# Patient Record
Sex: FEMALE | Race: WHITE | NOT HISPANIC OR LATINO | URBAN - METROPOLITAN AREA
[De-identification: names, ages, dates, MRNs, and addresses within clinical notes are randomized per-mention and may not be internally consistent; named-entity substitution may affect disease eponyms.]

---

## 2019-09-03 ENCOUNTER — APPOINTMENT (OUTPATIENT)
Dept: LAB | Facility: HOSPITAL | Age: 74
End: 2019-09-03
Payer: MEDICARE

## 2019-09-03 ENCOUNTER — TRANSCRIBE ORDERS (OUTPATIENT)
Dept: LAB | Facility: HOSPITAL | Age: 74
End: 2019-09-03

## 2019-09-03 ENCOUNTER — APPOINTMENT (OUTPATIENT)
Dept: PREADMISSION TESTING | Facility: HOSPITAL | Age: 74
End: 2019-09-03
Payer: MEDICARE

## 2019-09-03 VITALS
BODY MASS INDEX: 35.31 KG/M2 | HEART RATE: 90 BPM | SYSTOLIC BLOOD PRESSURE: 116 MMHG | TEMPERATURE: 97.1 F | HEIGHT: 68 IN | WEIGHT: 233 LBS | OXYGEN SATURATION: 96 % | RESPIRATION RATE: 20 BRPM | DIASTOLIC BLOOD PRESSURE: 66 MMHG

## 2019-09-03 DIAGNOSIS — Z01.818 ENCOUNTER FOR OTHER PREPROCEDURAL EXAMINATION: ICD-10-CM

## 2019-09-03 DIAGNOSIS — G47.33 OSA (OBSTRUCTIVE SLEEP APNEA): Primary | ICD-10-CM

## 2019-09-03 DIAGNOSIS — E78.5 HYPERLIPIDEMIA, UNSPECIFIED HYPERLIPIDEMIA TYPE: ICD-10-CM

## 2019-09-03 DIAGNOSIS — M17.11 PRIMARY OSTEOARTHRITIS OF RIGHT KNEE: Primary | ICD-10-CM

## 2019-09-03 DIAGNOSIS — M17.11 PRIMARY OSTEOARTHRITIS OF RIGHT KNEE: ICD-10-CM

## 2019-09-03 LAB
ANION GAP SERPL CALC-SCNC: 6 MEQ/L (ref 3–15)
BUN SERPL-MCNC: 13 MG/DL (ref 8–20)
CALCIUM SERPL-MCNC: 8.8 MG/DL (ref 8.9–10.3)
CHLORIDE SERPL-SCNC: 105 MEQ/L (ref 98–109)
CO2 SERPL-SCNC: 29 MEQ/L (ref 22–32)
CREAT SERPL-MCNC: 0.7 MG/DL
ERYTHROCYTE [DISTWIDTH] IN BLOOD BY AUTOMATED COUNT: 12.9 % (ref 11.7–14.4)
GFR SERPL CREATININE-BSD FRML MDRD: >60 ML/MIN/1.73M*2
GLUCOSE SERPL-MCNC: 104 MG/DL (ref 70–99)
HCT VFR BLDCO AUTO: 37.6 %
HGB BLD-MCNC: 12.4 G/DL
MCH RBC QN AUTO: 28.1 PG (ref 28–33.2)
MCHC RBC AUTO-ENTMCNC: 33 G/DL (ref 32.2–35.5)
MCV RBC AUTO: 85.1 FL (ref 83–98)
PDW BLD AUTO: 10.1 FL (ref 9.4–12.3)
PLATELET # BLD AUTO: 241 K/UL
POTASSIUM SERPL-SCNC: 3.7 MEQ/L (ref 3.6–5.1)
RBC # BLD AUTO: 4.42 M/UL (ref 3.93–5.22)
SODIUM SERPL-SCNC: 140 MEQ/L (ref 136–144)
WBC # BLD AUTO: 8.62 K/UL

## 2019-09-03 PROCEDURE — 36415 COLL VENOUS BLD VENIPUNCTURE: CPT

## 2019-09-03 PROCEDURE — 85027 COMPLETE CBC AUTOMATED: CPT

## 2019-09-03 PROCEDURE — 80048 BASIC METABOLIC PNL TOTAL CA: CPT

## 2019-09-03 PROCEDURE — 99203 OFFICE O/P NEW LOW 30 MIN: CPT | Performed by: HOSPITALIST

## 2019-09-03 RX ORDER — LEVOTHYROXINE SODIUM 200 UG/1
200 TABLET ORAL 3 TIMES WEEKLY
COMMUNITY

## 2019-09-03 RX ORDER — LORATADINE 10 MG/1
10 TABLET ORAL EVERY EVENING
COMMUNITY

## 2019-09-03 RX ORDER — ROSUVASTATIN CALCIUM 10 MG/1
10 TABLET, COATED ORAL NIGHTLY
COMMUNITY

## 2019-09-03 RX ORDER — LEVOTHYROXINE SODIUM 175 UG/1
175 TABLET ORAL
COMMUNITY

## 2019-09-03 RX ORDER — ASPIRIN 81 MG/1
81 TABLET ORAL DAILY
Status: ON HOLD | COMMUNITY
End: 2019-09-23

## 2019-09-03 RX ORDER — ACETAMINOPHEN 500 MG
2000 TABLET ORAL DAILY
COMMUNITY

## 2019-09-03 RX ORDER — DOXYCYCLINE HYCLATE 50 MG/1
50 CAPSULE, GELATIN COATED ORAL EVERY EVENING
COMMUNITY

## 2019-09-03 ASSESSMENT — PAIN SCALES - GENERAL: PAINLEVEL: 10-WORST PAIN EVER

## 2019-09-03 NOTE — PRE-PROCEDURE INSTRUCTIONS
1. Admissions will call you with your arrival time on September 20th (Friday  prior to surgery) between 2pm - 4pm. For questions about your arrival time, please call [phone].    2. Please report to the [facility] in the [place] on the day of your procedure. From the [place] Lobby stay to the right side of the lobby, down the short hallway into the atrium. If you are parking in the [facility] [place] [facility], come to the ground floor of the garage and follow signs to the [facility] which will bring you up a ramp into the atrium. Bring your insurance card and photo ID.     3. Please follow these fasting guidelines:   -Stop food and liquids 8 hours before your arrival time.   -You may continue to drink up to 12 oz of water,but you must stop 2 hours prior to your arrival time.   -There is nothing to drink 2 hours before your arrival time (including gum, mints, candy, and water).    4. Early on the morning of the procedure, please take the following medications listed below with a sip of water, in addition to any medications prescribed by your surgeon:  TAKE ONLY LEVOTHYROXINE AM OF SURGERY WITH SIP OF WATER       *NO aspirin, ibuprofen, anti-inflammatories, fish oil or Vitamin E unless ordered by physician.    *Stop taking ABOVE PER DR ARMIJO      5. Other instructions: antibacterial shower x 2, NIGHT BEFORE SURGERY AND AM OF SURGERY,   NO BODY PRODUCTS   brush teeth    6. If you develop a cough, cold, fever, or other symptom prior to the date of the procedure, please report it to your physician immediately.    7. If you need to cancel the procedure for any reason, please contact your physician.    8. Make arrangements to have someone drive you home from the procedure. If you have not arranged for transportation home, your surgery may be cancelled.     9. You may not take public transportation or or a cab unless accompanied by a responsible person.    10. You may not drive a car or operate complex or  potentially dangerous machinery for 24 hours following anesthesia and/or sedation.    11. If it is medically necessary for you to have a longer stay, you will be informed as soon as the decision is made.    12. Do not wear or bring anything of value to the hospital, including jewelry of any kind. Do not wear make-up or contact lenses. DO bring your glasses and hearing aid, with a case.    13. Dress in comfortable clothes.    14. If instructed, please bring a copy of your Advance Directive (Living Will/Durable Power of ) on the day of your procedure.    Pre operative instructions given as per protocol.  Form explained by:     LOCO CELESTE RN  [phone]

## 2019-09-03 NOTE — CONSULTS
[facility] Medicine Service -  Pre-Operative Consultation       Patient Name: Milly Shepherd  Referring Surgeon: Dr. Otilio Fernandez    Reason for Referral: Pre-Operative Evaluation  Surgical Procedure: Right knee replacement  Operative Date: 9/23/2019  Other Providers:      PCP: Corey Michelle DO          HISTORY OF PRESENT ILLNESS      Milly Shepherd is a 74 y.o. female presenting today to the [facility] at [facility] for pre-operative evaluation prior to planned surgery.    States has had pain in her right knee for approxinately 7 years.  Tried management with exercise, pain control and knee injections but pain has progressively worsened in recent months and now starting to interfere with her mobility, activity levels.  Orthopedic evaluation revealed her to have bone-on-bone arthritis in her right knee so surgery recommended.    In regards to medical history:      The patient denies any current or recent chest pain or pressure, dyspnea, cough, sputum, fevers, chills, abdominal pain, nausea, vomiting, diarrhea or other symptoms. No urinary symptoms, no BRBPR or melena    Functionally, the patient is able to ascend a flight or so of stairs with no dyspnea or chest pain.     States she is compliant with her CPAP use.    Does have a hx of SVT which causes her occasional palpitations but none in several months.    The patient denies, on specific questioning, the following:  No history of MI or CHF.  No history of DVT/PE.  No history of COPD.  No history of CVA.  No history of DM.   No history of CKD.     PAST MEDICAL AND SURGICAL HISTORY      Past Medical History:   Diagnosis Date   • Anemia     in past   • Arthritis    • Depression     never on meds   • Heart palpitations     none recently   followed by cardiologist   • Hypothyroidism    • Lipid disorder    • Low back problem     occ back pain   arthritis    • Rosacea    • Seasonal allergies    • Sleep apnea   "   has cpap       Past Surgical History:   Procedure Laterality Date   • COLONOSCOPY     • DILATION AND CURETTAGE OF UTERUS     • HYSTERECTOMY         MEDICATIONS        Current Outpatient Prescriptions:   •  aspirin 81 mg enteric coated tablet, Take 81 mg by mouth daily. Plans to stop preop, Disp: , Rfl:   •  cholecalciferol, vitamin D3, (VITAMIN D3) 2,000 unit capsule, Take 2,000 Units by mouth daily., Disp: , Rfl:   •  doxycycline hyclate (VIBRAMYCIN) 50 mg capsule, Take 50 mg by mouth every evening., Disp: , Rfl:   •  levothyroxine (SYNTHROID) 175 mcg tablet, Take 175 mcg by mouth 4 (four) times a week. MTWT, Disp: , Rfl:   •  levothyroxine (SYNTHROID) 200 mcg tablet, Take 200 mcg by mouth 3 (three) times a week (Mon, Wed, Fri)., Disp: , Rfl:   •  loratadine (CLARITIN) 10 mg tablet, Take 10 mg by mouth every evening., Disp: , Rfl:   •  rosuvastatin (CRESTOR) 10 mg tablet, Take 10 mg by mouth nightly., Disp: , Rfl:     ALLERGIES      Patient has no known allergies.    FAMILY HISTORY      No pertinent family history    Denies any prior known family history of DVTs/PEs/clotting disorder    SOCIAL HISTORY      Social History   Substance Use Topics   • Smoking status: Former Smoker     Packs/day: 1.00     Years: 5.00     Types: Cigarettes   • Smokeless tobacco: Never Used      Comment: quit 40 yrs ago   • Alcohol use Yes      Comment: 1x/ month       REVIEW OF SYSTEMS      All other systems reviewed and negative except as noted in HPI    PHYSICAL EXAMINATION      /66 (BP Location: Right upper arm, Patient Position: Sitting)   Pulse 90   Temp 36.2 °C (97.1 °F) (Temporal)   Resp 20   Ht 1.727 m (5' 8\")   Wt 106 kg (233 lb)   SpO2 96%   Breastfeeding? No   BMI 35.43 kg/m²   Body mass index is 35.43 kg/m².    Physical Exam   Constitutional: She is oriented to person, place, and time. No distress.   HENT:   Head: Normocephalic and atraumatic.   Eyes: Pupils are equal, round, and reactive to light. " Conjunctivae are normal.   Neck: Neck supple.   Cardiovascular: Normal rate, regular rhythm and normal heart sounds.    No murmur heard.  Pulmonary/Chest: Effort normal and breath sounds normal. No respiratory distress. She has no wheezes. She has no rales.   Abdominal: Soft. Bowel sounds are normal. She exhibits no distension. There is no tenderness. There is no guarding.   Musculoskeletal:   Right knee limited ROM   Neurological: She is alert and oriented to person, place, and time. No cranial nerve deficit.   Skin: Skin is warm and dry. No rash noted.   Psychiatric: She has a normal mood and affect.       LABS / EKG        Labs  reviewed    Lab Results   Component Value Date     09/03/2019    K 3.7 09/03/2019     09/03/2019    BUN 13 09/03/2019    CREATININE 0.7 09/03/2019    WBC 8.62 09/03/2019    HGB 12.4 09/03/2019    HCT 37.6 09/03/2019     09/03/2019         ECG/Telemetry  EKG 8/29/2019 - sinus rhythm, left anterior sean block    ASSESSMENT AND PLAN         Encounter for other preprocedural examination  Good exercise capacity able to reach >4 METs activity level.  No hx of CAD, no CHF, no TIA/CVA. Does have a hx of SVT  Has been seen by her cardiologist Dr. Marquez for preop cardiovascular evaluation and cleared for surgery  EKG without any signs of active ischemia  Her CBC and BMP within acceptable limits    P:  Can proceed to surgery without additional testing.  NSAID use preop for orthopedic recommendations.    H/O supraventricular tachycardia  With palpitations though pt denies any symptoms in recent months  Monitor post op    DIONY (obstructive sleep apnea)  Continue CPAP nightly and as needed.  Monitor respiratory status closely helena-and postop on DIONY protocol.  Cautious use of narcotics/sedatives.    Hypothyroidism  Continue levothyroxine.    Hyperlipidemia  Continue statin.       In regards to perioperative cardiac risk:  The patient denies any history of ischemic heart disease,  denies any history of CHF, denies any history of CVA, is not on pre-operative treatment with insulin, and does not have a pre-operative creatinine > 2 mg/dL.   The Revised Cardiac Risk Index (RCRI) for this patient indicates 0.4% risk.     Further comments:  Resume supplements when OK with surgical team.  I would encourage incentive spirometry to assist with minimizing helena-operative pulmonary risk.  DVT prophylaxis and timing of such per the discretion of the surgeon.     Please do not hesitate to contact [facility] during the upcoming hospitalization with any questions or concerns.     Tim Alexis MD  9/4/2019

## 2019-09-03 NOTE — ASSESSMENT & PLAN NOTE
Good exercise capacity able to reach >4 METs activity level.  No hx of CAD, no CHF, no TIA/CVA. Does have a hx of SVT  Has been seen by her cardiologist Dr. Marquez for preop cardiovascular evaluation and cleared for surgery  EKG without any signs of active ischemia  Her CBC and BMP within acceptable limits    P:  Can proceed to surgery without additional testing.  NSAID use preop for orthopedic recommendations.

## 2019-09-04 PROBLEM — Z86.79 H/O SUPRAVENTRICULAR TACHYCARDIA: Status: ACTIVE | Noted: 2019-09-04

## 2019-09-04 PROBLEM — E78.5 HYPERLIPIDEMIA: Status: ACTIVE | Noted: 2019-09-04

## 2019-09-04 PROBLEM — G47.33 OSA (OBSTRUCTIVE SLEEP APNEA): Status: ACTIVE | Noted: 2019-09-04

## 2019-09-04 PROBLEM — E03.9 HYPOTHYROIDISM: Status: ACTIVE | Noted: 2019-09-04

## 2019-09-04 NOTE — ASSESSMENT & PLAN NOTE
Continue CPAP nightly and as needed.  Monitor respiratory status closely helena-and postop on DIONY protocol.  Cautious use of narcotics/sedatives.

## 2019-09-23 ENCOUNTER — ANESTHESIA EVENT (OUTPATIENT)
Dept: OPERATING ROOM | Facility: HOSPITAL | Age: 74
Setting detail: SURGERY ADMIT
DRG: 470 | End: 2019-09-23
Payer: MEDICARE

## 2019-09-23 ENCOUNTER — HOSPITAL ENCOUNTER (INPATIENT)
Facility: HOSPITAL | Age: 74
LOS: 3 days | DRG: 470 | End: 2019-09-26
Payer: MEDICARE

## 2019-09-23 ENCOUNTER — APPOINTMENT (OUTPATIENT)
Dept: RADIOLOGY | Facility: HOSPITAL | Age: 74
Setting detail: SURGERY ADMIT
DRG: 470 | End: 2019-09-23
Attending: NURSE PRACTITIONER
Payer: MEDICARE

## 2019-09-23 DIAGNOSIS — Z96.651 S/P TOTAL KNEE ARTHROPLASTY, RIGHT: Primary | ICD-10-CM

## 2019-09-23 LAB
GLUCOSE BLD-MCNC: 104 MG/DL (ref 70–99)
GLUCOSE BLD-MCNC: 115 MG/DL (ref 70–99)
POCT TEST: ABNORMAL
POCT TEST: ABNORMAL

## 2019-09-23 PROCEDURE — 63600000 HC DRUGS/DETAIL CODE

## 2019-09-23 PROCEDURE — 25800000 HC PHARMACY IV SOLUTIONS

## 2019-09-23 PROCEDURE — 25000000 HC PHARMACY GENERAL: Performed by: NURSE ANESTHETIST, CERTIFIED REGISTERED

## 2019-09-23 PROCEDURE — 71000011 HC PACU PHASE 1 EA ADDL MIN

## 2019-09-23 PROCEDURE — 36000016 HC OR LEVEL 6 EA ADDL MIN

## 2019-09-23 PROCEDURE — 63600000 HC DRUGS/DETAIL CODE: Performed by: ANESTHESIOLOGY

## 2019-09-23 PROCEDURE — 37000010 HC ANESTHESIA SPINAL

## 2019-09-23 PROCEDURE — 63700000 HC SELF-ADMINISTRABLE DRUG: Performed by: NURSE PRACTITIONER

## 2019-09-23 PROCEDURE — 99232 SBSQ HOSP IP/OBS MODERATE 35: CPT | Performed by: HOSPITALIST

## 2019-09-23 PROCEDURE — 25000000 HC PHARMACY GENERAL: Performed by: ANESTHESIOLOGY

## 2019-09-23 PROCEDURE — 63700000 HC SELF-ADMINISTRABLE DRUG

## 2019-09-23 PROCEDURE — 97116 GAIT TRAINING THERAPY: CPT | Mod: GP

## 2019-09-23 PROCEDURE — 63600000 HC DRUGS/DETAIL CODE: Performed by: NURSE PRACTITIONER

## 2019-09-23 PROCEDURE — 25000000 HC PHARMACY GENERAL

## 2019-09-23 PROCEDURE — 36000006 HC OR LEVEL 6 INITIAL 30MIN

## 2019-09-23 PROCEDURE — 25800000 HC PHARMACY IV SOLUTIONS: Performed by: NURSE ANESTHETIST, CERTIFIED REGISTERED

## 2019-09-23 PROCEDURE — 27200000 HC STERILE SUPPLY

## 2019-09-23 PROCEDURE — 73560 X-RAY EXAM OF KNEE 1 OR 2: CPT | Mod: RT

## 2019-09-23 PROCEDURE — 71000001 HC PACU PHASE 1 INITIAL 30MIN

## 2019-09-23 PROCEDURE — 97162 PT EVAL MOD COMPLEX 30 MIN: CPT | Mod: GP

## 2019-09-23 PROCEDURE — C1776 JOINT DEVICE (IMPLANTABLE): HCPCS

## 2019-09-23 PROCEDURE — 0SRC0JA REPLACEMENT OF RIGHT KNEE JOINT WITH SYNTHETIC SUBSTITUTE, UNCEMENTED, OPEN APPROACH: ICD-10-PCS

## 2019-09-23 PROCEDURE — 63600000 HC DRUGS/DETAIL CODE: Performed by: NURSE ANESTHETIST, CERTIFIED REGISTERED

## 2019-09-23 PROCEDURE — 97166 OT EVAL MOD COMPLEX 45 MIN: CPT | Mod: GO

## 2019-09-23 PROCEDURE — 12000000 HC ROOM AND CARE MED/SURG

## 2019-09-23 PROCEDURE — 25800000 HC PHARMACY IV SOLUTIONS: Performed by: NURSE PRACTITIONER

## 2019-09-23 DEVICE — PATELLA
Type: IMPLANTABLE DEVICE | Site: PATELLA | Status: FUNCTIONAL
Brand: TRIATHLON

## 2019-09-23 DEVICE — CRUCIATE RETAINING FEMORAL
Type: IMPLANTABLE DEVICE | Site: KNEE | Status: FUNCTIONAL
Brand: TRIATHLON

## 2019-09-23 DEVICE — TIBIAL BEARING INSERT
Type: IMPLANTABLE DEVICE | Site: KNEE | Status: FUNCTIONAL
Brand: TRIATHLON

## 2019-09-23 DEVICE — TIBIAL COMPONENT
Type: IMPLANTABLE DEVICE | Site: KNEE | Status: FUNCTIONAL
Brand: TRIATHLON

## 2019-09-23 RX ORDER — LIDOCAINE HYDROCHLORIDE 10 MG/ML
INJECTION, SOLUTION INFILTRATION; PERINEURAL AS NEEDED
Status: DISCONTINUED | OUTPATIENT
Start: 2019-09-23 | End: 2019-09-23 | Stop reason: SURG

## 2019-09-23 RX ORDER — LEVOTHYROXINE SODIUM 100 UG/1
200 TABLET ORAL
Status: DISCONTINUED | OUTPATIENT
Start: 2019-09-27 | End: 2019-09-26 | Stop reason: HOSPADM

## 2019-09-23 RX ORDER — DEXTROSE 50 % IN WATER (D50W) INTRAVENOUS SYRINGE
25 AS NEEDED
Status: DISCONTINUED | OUTPATIENT
Start: 2019-09-23 | End: 2019-09-23 | Stop reason: HOSPADM

## 2019-09-23 RX ORDER — LEVOTHYROXINE SODIUM 175 UG/1
175 TABLET ORAL
Status: DISCONTINUED | OUTPATIENT
Start: 2019-09-24 | End: 2019-09-26 | Stop reason: HOSPADM

## 2019-09-23 RX ORDER — ACETAMINOPHEN 325 MG/1
650 TABLET ORAL
Status: COMPLETED | OUTPATIENT
Start: 2019-09-23 | End: 2019-09-25

## 2019-09-23 RX ORDER — KETOROLAC TROMETHAMINE 15 MG/ML
15 INJECTION, SOLUTION INTRAMUSCULAR; INTRAVENOUS
Status: COMPLETED | OUTPATIENT
Start: 2019-09-23 | End: 2019-09-25

## 2019-09-23 RX ORDER — ACETAMINOPHEN 325 MG/1
975 TABLET ORAL
Status: COMPLETED | OUTPATIENT
Start: 2019-09-23 | End: 2019-09-23

## 2019-09-23 RX ORDER — ACETAMINOPHEN 325 MG/1
TABLET ORAL
Status: COMPLETED
Start: 2019-09-23 | End: 2019-09-24

## 2019-09-23 RX ORDER — LABETALOL HCL 20 MG/4 ML
SYRINGE (ML) INTRAVENOUS AS NEEDED
Status: DISCONTINUED | OUTPATIENT
Start: 2019-09-23 | End: 2019-09-23 | Stop reason: SURG

## 2019-09-23 RX ORDER — DEXAMETHASONE SODIUM PHOSPHATE 4 MG/ML
8 INJECTION, SOLUTION INTRA-ARTICULAR; INTRALESIONAL; INTRAMUSCULAR; INTRAVENOUS; SOFT TISSUE ONCE
Status: COMPLETED | OUTPATIENT
Start: 2019-09-24 | End: 2019-09-24

## 2019-09-23 RX ORDER — FENTANYL CITRATE 50 UG/ML
50 INJECTION, SOLUTION INTRAMUSCULAR; INTRAVENOUS
Status: DISCONTINUED | OUTPATIENT
Start: 2019-09-23 | End: 2019-09-23 | Stop reason: HOSPADM

## 2019-09-23 RX ORDER — HYDROMORPHONE HYDROCHLORIDE 1 MG/ML
0.5 INJECTION, SOLUTION INTRAMUSCULAR; INTRAVENOUS; SUBCUTANEOUS
Status: DISCONTINUED | OUTPATIENT
Start: 2019-09-23 | End: 2019-09-23 | Stop reason: HOSPADM

## 2019-09-23 RX ORDER — LABETALOL HCL 20 MG/4 ML
5 SYRINGE (ML) INTRAVENOUS AS NEEDED
Status: DISCONTINUED | OUTPATIENT
Start: 2019-09-23 | End: 2019-09-23 | Stop reason: HOSPADM

## 2019-09-23 RX ORDER — CETIRIZINE HYDROCHLORIDE 5 MG/1
5 TABLET ORAL NIGHTLY
Status: DISCONTINUED | OUTPATIENT
Start: 2019-09-23 | End: 2019-09-26 | Stop reason: HOSPADM

## 2019-09-23 RX ORDER — DOXYCYCLINE HYCLATE 50 MG/1
50 CAPSULE, GELATIN COATED ORAL EVERY EVENING
Status: DISCONTINUED | OUTPATIENT
Start: 2019-09-23 | End: 2019-09-26 | Stop reason: HOSPADM

## 2019-09-23 RX ORDER — CELECOXIB 200 MG/1
CAPSULE ORAL
Status: DISCONTINUED
Start: 2019-09-23 | End: 2019-09-26 | Stop reason: HOSPADM

## 2019-09-23 RX ORDER — PROPOFOL 10 MG/ML
INJECTION, EMULSION INTRAVENOUS AS NEEDED
Status: DISCONTINUED | OUTPATIENT
Start: 2019-09-23 | End: 2019-09-23 | Stop reason: SURG

## 2019-09-23 RX ORDER — BISACODYL 10 MG/1
10 SUPPOSITORY RECTAL DAILY PRN
Status: DISCONTINUED | OUTPATIENT
Start: 2019-09-23 | End: 2019-09-26 | Stop reason: HOSPADM

## 2019-09-23 RX ORDER — IBUPROFEN 200 MG
16-32 TABLET ORAL AS NEEDED
Status: DISCONTINUED | OUTPATIENT
Start: 2019-09-23 | End: 2019-09-26 | Stop reason: HOSPADM

## 2019-09-23 RX ORDER — ASPIRIN 81 MG/1
81 TABLET ORAL DAILY
Qty: 30 TABLET | Refills: 0
Start: 2019-09-23 | End: 2019-10-23

## 2019-09-23 RX ORDER — FENTANYL CITRATE 50 UG/ML
INJECTION, SOLUTION INTRAMUSCULAR; INTRAVENOUS AS NEEDED
Status: DISCONTINUED | OUTPATIENT
Start: 2019-09-23 | End: 2019-09-23 | Stop reason: SURG

## 2019-09-23 RX ORDER — IBUPROFEN 200 MG
16-32 TABLET ORAL AS NEEDED
Status: DISCONTINUED | OUTPATIENT
Start: 2019-09-23 | End: 2019-09-23 | Stop reason: HOSPADM

## 2019-09-23 RX ORDER — DIPHENHYDRAMINE HCL 25 MG
25 CAPSULE ORAL EVERY 6 HOURS PRN
Status: DISCONTINUED | OUTPATIENT
Start: 2019-09-23 | End: 2019-09-26 | Stop reason: HOSPADM

## 2019-09-23 RX ORDER — ONDANSETRON HYDROCHLORIDE 2 MG/ML
INJECTION, SOLUTION INTRAVENOUS AS NEEDED
Status: DISCONTINUED | OUTPATIENT
Start: 2019-09-23 | End: 2019-09-23 | Stop reason: SURG

## 2019-09-23 RX ORDER — PROPOFOL 10 MG/ML
INJECTION, EMULSION INTRAVENOUS CONTINUOUS PRN
Status: DISCONTINUED | OUTPATIENT
Start: 2019-09-23 | End: 2019-09-23 | Stop reason: SURG

## 2019-09-23 RX ORDER — ONDANSETRON 4 MG/1
4 TABLET, ORALLY DISINTEGRATING ORAL EVERY 8 HOURS PRN
Status: DISCONTINUED | OUTPATIENT
Start: 2019-09-23 | End: 2019-09-26 | Stop reason: HOSPADM

## 2019-09-23 RX ORDER — NAPROXEN SODIUM 220 MG/1
81 TABLET, FILM COATED ORAL 2 TIMES DAILY
Status: DISCONTINUED | OUTPATIENT
Start: 2019-09-23 | End: 2019-09-26 | Stop reason: HOSPADM

## 2019-09-23 RX ORDER — NAPROXEN SODIUM 220 MG/1
81 TABLET, FILM COATED ORAL 2 TIMES DAILY
Qty: 84 TABLET | Refills: 0 | Status: SHIPPED | OUTPATIENT
Start: 2019-09-23 | End: 2019-11-04

## 2019-09-23 RX ORDER — OXYCODONE HYDROCHLORIDE 5 MG/1
5-10 TABLET ORAL EVERY 4 HOURS PRN
Qty: 50 TABLET | Refills: 0
Start: 2019-09-23 | End: 2019-09-28

## 2019-09-23 RX ORDER — ACETAMINOPHEN 325 MG/1
650 TABLET ORAL EVERY 4 HOURS PRN
Status: DISCONTINUED | OUTPATIENT
Start: 2019-09-23 | End: 2019-09-26 | Stop reason: HOSPADM

## 2019-09-23 RX ORDER — AMOXICILLIN 250 MG
1 CAPSULE ORAL 2 TIMES DAILY
Qty: 60 TABLET | Refills: 0 | Status: SHIPPED | OUTPATIENT
Start: 2019-09-23 | End: 2019-10-23

## 2019-09-23 RX ORDER — ALUMINUM HYDROXIDE, MAGNESIUM HYDROXIDE, AND SIMETHICONE 1200; 120; 1200 MG/30ML; MG/30ML; MG/30ML
30 SUSPENSION ORAL EVERY 4 HOURS PRN
Status: DISCONTINUED | OUTPATIENT
Start: 2019-09-23 | End: 2019-09-26 | Stop reason: HOSPADM

## 2019-09-23 RX ORDER — SODIUM CHLORIDE 9 MG/ML
INJECTION, SOLUTION INTRAVENOUS CONTINUOUS
Status: ACTIVE | OUTPATIENT
Start: 2019-09-23 | End: 2019-09-24

## 2019-09-23 RX ORDER — DEXTROSE 50 % IN WATER (D50W) INTRAVENOUS SYRINGE
25 AS NEEDED
Status: DISCONTINUED | OUTPATIENT
Start: 2019-09-23 | End: 2019-09-26 | Stop reason: HOSPADM

## 2019-09-23 RX ORDER — OXYCODONE HYDROCHLORIDE 5 MG/1
5-10 TABLET ORAL EVERY 4 HOURS PRN
Status: DISCONTINUED | OUTPATIENT
Start: 2019-09-23 | End: 2019-09-26 | Stop reason: HOSPADM

## 2019-09-23 RX ORDER — ROSUVASTATIN CALCIUM 10 MG/1
10 TABLET, COATED ORAL NIGHTLY
Status: DISCONTINUED | OUTPATIENT
Start: 2019-09-23 | End: 2019-09-26 | Stop reason: HOSPADM

## 2019-09-23 RX ORDER — AMOXICILLIN 250 MG
1 CAPSULE ORAL 2 TIMES DAILY
Status: DISCONTINUED | OUTPATIENT
Start: 2019-09-23 | End: 2019-09-26 | Stop reason: HOSPADM

## 2019-09-23 RX ORDER — BUPIVACAINE HYDROCHLORIDE 7.5 MG/ML
INJECTION, SOLUTION INTRASPINAL AS NEEDED
Status: DISCONTINUED | OUTPATIENT
Start: 2019-09-23 | End: 2019-09-23 | Stop reason: SURG

## 2019-09-23 RX ORDER — CELECOXIB 200 MG/1
400 CAPSULE ORAL
Status: COMPLETED | OUTPATIENT
Start: 2019-09-23 | End: 2019-09-23

## 2019-09-23 RX ORDER — POLYETHYLENE GLYCOL 3350 17 G/17G
17 POWDER, FOR SOLUTION ORAL DAILY
Status: DISCONTINUED | OUTPATIENT
Start: 2019-09-23 | End: 2019-09-26 | Stop reason: HOSPADM

## 2019-09-23 RX ORDER — SODIUM CHLORIDE 9 MG/ML
INJECTION, SOLUTION INTRAVENOUS CONTINUOUS PRN
Status: DISCONTINUED | OUTPATIENT
Start: 2019-09-23 | End: 2019-09-23 | Stop reason: SURG

## 2019-09-23 RX ORDER — DEXTROSE 40 %
15-30 GEL (GRAM) ORAL AS NEEDED
Status: DISCONTINUED | OUTPATIENT
Start: 2019-09-23 | End: 2019-09-23 | Stop reason: HOSPADM

## 2019-09-23 RX ORDER — MIDAZOLAM HYDROCHLORIDE 2 MG/2ML
INJECTION, SOLUTION INTRAMUSCULAR; INTRAVENOUS AS NEEDED
Status: DISCONTINUED | OUTPATIENT
Start: 2019-09-23 | End: 2019-09-23 | Stop reason: SURG

## 2019-09-23 RX ORDER — DIPHENHYDRAMINE HCL 50 MG/ML
25 VIAL (ML) INJECTION EVERY 6 HOURS PRN
Status: DISCONTINUED | OUTPATIENT
Start: 2019-09-23 | End: 2019-09-26 | Stop reason: HOSPADM

## 2019-09-23 RX ORDER — POLYETHYLENE GLYCOL 3350 17 G/17G
17 POWDER, FOR SOLUTION ORAL DAILY PRN
Start: 2019-09-23 | End: 2019-09-26

## 2019-09-23 RX ORDER — ONDANSETRON HYDROCHLORIDE 2 MG/ML
4 INJECTION, SOLUTION INTRAVENOUS
Status: DISCONTINUED | OUTPATIENT
Start: 2019-09-23 | End: 2019-09-23 | Stop reason: HOSPADM

## 2019-09-23 RX ORDER — ONDANSETRON HYDROCHLORIDE 2 MG/ML
4 INJECTION, SOLUTION INTRAVENOUS EVERY 8 HOURS PRN
Status: DISCONTINUED | OUTPATIENT
Start: 2019-09-23 | End: 2019-09-26 | Stop reason: HOSPADM

## 2019-09-23 RX ORDER — DEXTROSE 40 %
15-30 GEL (GRAM) ORAL AS NEEDED
Status: DISCONTINUED | OUTPATIENT
Start: 2019-09-23 | End: 2019-09-26 | Stop reason: HOSPADM

## 2019-09-23 RX ORDER — ACETAMINOPHEN 325 MG/1
650 TABLET ORAL EVERY 6 HOURS PRN
Start: 2019-09-23 | End: 2019-10-23

## 2019-09-23 RX ADMIN — OXYCODONE HYDROCHLORIDE 10 MG: 5 TABLET ORAL at 22:38

## 2019-09-23 RX ADMIN — PROPOFOL 75 MCG/KG/MIN: 10 INJECTION, EMULSION INTRAVENOUS at 09:40

## 2019-09-23 RX ADMIN — KETOROLAC TROMETHAMINE 15 MG: 15 INJECTION, SOLUTION INTRAMUSCULAR; INTRAVENOUS at 20:42

## 2019-09-23 RX ADMIN — ACETAMINOPHEN 650 MG: 325 TABLET, FILM COATED ORAL at 14:31

## 2019-09-23 RX ADMIN — KETOROLAC TROMETHAMINE 15 MG: 15 INJECTION, SOLUTION INTRAMUSCULAR; INTRAVENOUS at 14:31

## 2019-09-23 RX ADMIN — OXYCODONE HYDROCHLORIDE 10 MG: 5 TABLET ORAL at 18:30

## 2019-09-23 RX ADMIN — PROPOFOL 40 MG: 10 INJECTION, EMULSION INTRAVENOUS at 09:40

## 2019-09-23 RX ADMIN — SODIUM CHLORIDE 2 G: 9 INJECTION, SOLUTION INTRAVENOUS at 09:49

## 2019-09-23 RX ADMIN — SODIUM CHLORIDE 2 G: 9 INJECTION, SOLUTION INTRAVENOUS at 18:31

## 2019-09-23 RX ADMIN — FENTANYL CITRATE 100 MCG: 50 INJECTION, SOLUTION INTRAMUSCULAR; INTRAVENOUS at 09:31

## 2019-09-23 RX ADMIN — ONDANSETRON 4 MG: 2 INJECTION INTRAMUSCULAR; INTRAVENOUS at 10:10

## 2019-09-23 RX ADMIN — ACETAMINOPHEN 650 MG: 325 TABLET, FILM COATED ORAL at 20:41

## 2019-09-23 RX ADMIN — DOXYCYCLINE HYCLATE 50 MG: 50 CAPSULE ORAL at 18:30

## 2019-09-23 RX ADMIN — CETIRIZINE HYDROCHLORIDE 5 MG: 5 TABLET ORAL at 20:41

## 2019-09-23 RX ADMIN — FENTANYL CITRATE 50 MCG: 50 INJECTION, SOLUTION INTRAMUSCULAR; INTRAVENOUS at 13:14

## 2019-09-23 RX ADMIN — SODIUM CHLORIDE: 9 INJECTION, SOLUTION INTRAVENOUS at 14:31

## 2019-09-23 RX ADMIN — LABETALOL 20 MG/4 ML (5 MG/ML) INTRAVENOUS SYRINGE 10 MG: at 10:12

## 2019-09-23 RX ADMIN — BUPIVACAINE HYDROCHLORIDE IN DEXTROSE 2 ML: 7.5 INJECTION, SOLUTION SUBARACHNOID at 09:37

## 2019-09-23 RX ADMIN — VANCOMYCIN HYDROCHLORIDE 1500 MG: 1 INJECTION, POWDER, LYOPHILIZED, FOR SOLUTION INTRAVENOUS at 08:13

## 2019-09-23 RX ADMIN — ASPIRIN 81 MG 81 MG: 81 TABLET ORAL at 20:41

## 2019-09-23 RX ADMIN — CELECOXIB 400 MG: 200 CAPSULE ORAL at 08:10

## 2019-09-23 RX ADMIN — OXYCODONE HYDROCHLORIDE 10 MG: 5 TABLET ORAL at 14:31

## 2019-09-23 RX ADMIN — SENNOSIDES AND DOCUSATE SODIUM 1 TABLET: 8.6; 5 TABLET ORAL at 20:41

## 2019-09-23 RX ADMIN — ACETAMINOPHEN 975 MG: 325 TABLET ORAL at 08:09

## 2019-09-23 RX ADMIN — MIDAZOLAM HYDROCHLORIDE 2 MG: 1 INJECTION, SOLUTION INTRAMUSCULAR; INTRAVENOUS at 09:31

## 2019-09-23 RX ADMIN — LIDOCAINE HYDROCHLORIDE 5 ML: 10 INJECTION, SOLUTION INFILTRATION; PERINEURAL at 09:34

## 2019-09-23 RX ADMIN — SODIUM CHLORIDE: 900 INJECTION, SOLUTION INTRAVENOUS at 09:28

## 2019-09-23 RX ADMIN — ROSUVASTATIN CALCIUM 10 MG: 10 TABLET, FILM COATED ORAL at 20:41

## 2019-09-23 ASSESSMENT — COGNITIVE AND FUNCTIONAL STATUS - GENERAL
STANDING UP FROM CHAIR USING ARMS: 3 - A LITTLE
MOVING TO AND FROM BED TO CHAIR: 3 - A LITTLE
HELP NEEDED FOR BATHING: 2 - A LOT
WALKING IN HOSPITAL ROOM: 3 - A LITTLE
DRESSING REGULAR LOWER BODY CLOTHING: 1 - TOTAL
HELP NEEDED FOR PERSONAL GROOMING: 3 - A LITTLE
DRESSING REGULAR UPPER BODY CLOTHING: 4 - NONE
HELP NEEDED FOR PERSONAL GROOMING: 3 - A LITTLE
DRESSING REGULAR UPPER BODY CLOTHING: 3 - A LITTLE
WALKING IN HOSPITAL ROOM: 3 - A LITTLE
TOILETING: 3 - A LITTLE
STANDING UP FROM CHAIR USING ARMS: 3 - A LITTLE
CLIMB 3 TO 5 STEPS WITH RAILING: 3 - A LITTLE
MOVING TO AND FROM BED TO CHAIR: 3 - A LITTLE
EATING MEALS: 4 - NONE
TOILETING: 2 - A LOT
DRESSING REGULAR LOWER BODY CLOTHING: 3 - A LITTLE
HELP NEEDED FOR BATHING: 3 - A LITTLE
CLIMB 3 TO 5 STEPS WITH RAILING: 3 - A LITTLE
EATING MEALS: 4 - NONE

## 2019-09-23 NOTE — ANESTHESIA POSTPROCEDURE EVALUATION
Patient: Milly Shepherd    Procedure Summary     Date:  09/23/19 Room / Location:  [facility] OR  / [facility] OR [facility]    Anesthesia Start:  0928 Anesthesia Stop:  1127    Procedure:  Total Knee Replacement Right (Right Knee) Diagnosis:       Unilateral primary osteoarthritis, right knee      (Unilateral Primary Osteoarthritis Right knee M17.11)    Surgeon:  Otilio Fernandez MD Responsible Provider:  Amy Cuellar MD    Anesthesia Type:  spinal ASA Status:  3          Anesthesia Type: spinal  PACU Vitals  9/23/2019 1122 - 9/23/2019 1144      9/23/2019 1129 9/23/2019 1130 9/23/2019 1140       BP: - (!)  124/56 128/61     Temp: 36.3 °C (97.4 °F) - -     Pulse: - 88 82     Resp: - 12 -     SpO2: - 97 % 97 %             Anesthesia Post Evaluation    Pain management: adequate  Mode of pain management: IV medication  Patient location during evaluation: PACU  Patient participation: complete - patient participated  Level of consciousness: awake and alert  Cardiovascular status: acceptable  Airway Patency: adequate  Respiratory status: acceptable  Hydration status: acceptable  Anesthetic complications: no

## 2019-09-23 NOTE — PLAN OF CARE
Problem: Patient Care Overview  Goal: Plan of Care Review  Outcome: Ongoing (interventions implemented as appropriate)   09/23/19 3966   Coping/Psychosocial   Plan Of Care Reviewed With patient   Plan of Care Review   Outcome Summary pt requires assistance for all mobility, continue PT to increase level of mobility     Goal: Individualization & Mutuality  Outcome: Ongoing (interventions implemented as appropriate)      Problem: Knee Arthroplasty (Total, Partial) (Adult)  Goal: Signs and Symptoms of Listed Potential Problems Will be Absent, Minimized or Managed (Knee Arthroplasty)  Outcome: Ongoing (interventions implemented as appropriate)      Problem: Acute Therapy Services Goal & Intervention Plan  Goal: Bed Mobility Goal  Outcome: Ongoing (interventions implemented as appropriate)    Goal: Gait Training Goal  Outcome: Ongoing (interventions implemented as appropriate)    Goal: Transfer Training Goal  Outcome: Ongoing (interventions implemented as appropriate)

## 2019-09-23 NOTE — PROGRESS NOTES
Orthopedic Post Surgical Note    74 year old female s/p Right TKA.     Physical Exam:  - dressing clean, dry, intact with mepilex to right knee   - sensation intact to light touch  - palpable DP, PT pulses  - active dorsiflexion, plantarflexion, extensor hallucis longus    A/P:   - pain control  - physical therapy/occupational therapy  - DVT prophylaxis> asa 81 mg BID   - [facility] consult for medical management

## 2019-09-23 NOTE — PROGRESS NOTES
Patient: Milly Shepherd  Location: [facility] [id number]  MRN: [id number]  Today's date: 9/23/2019     Pt in BS chair, alarm on, legs elevated, call bell within reach  Patient Active Problem List:     Encounter for other preprocedural examination     H/O supraventricular tachycardia     DIONY (obstructive sleep apnea)     Hypothyroidism     Hyperlipidemia     S/P total knee arthroplasty, right   Past Medical History:  No date: Anemia      Comment:  in past  No date: Arthritis  No date: Heart palpitations      Comment:  none recently   followed by cardiologist  No date: Hypothyroidism  No date: Lipid disorder  No date: Low back problem      Comment:  occ back pain   arthritis   No date: Rosacea  No date: Seasonal allergies  No date: Sleep apnea      Comment:  has cpap    Hospital Course  Milly is a 74 y.o. female admitted on 9/23/2019 with Unilateral primary osteoarthritis, right knee [M17.11]  S/P total knee arthroplasty, right [Z96.651]. Principal problem is S/P total knee arthroplasty, right.    Milly has a past medical history of Anemia; Arthritis; Heart palpitations; Hypothyroidism; Lipid disorder; Low back problem; Rosacea; Seasonal allergies; and Sleep apnea.     S/p R TKA           Therapy Pain/Vitals     Row Name 09/23/19 1558          Pain/Comfort/Sleep    Pain Charting Type Pain Assessment     Presence of Pain complains of pain/discomfort     Preferred Pain Scale number (Numeric Rating Pain Scale)     Pain Body Location - Side Right     Pain Body Location knee     Pain Rating (0-10): Rest 3     Pain Rating (0-10): Activity 3     Pain Management Interventions position adjusted        Vital Signs    Pulse 81     SpO2 96 %     Patient Activity At rest     Oxygen Therapy None (Room air)     BP (!)  109/58     BP Location Right upper arm     BP Method Automatic     Patient Position Lying           Prior Living Environment      Most Recent Value   Lives With  alone   Living Arrangements  house   Living  Environment Comment  1SH, 2 KAREN with railing. walk in shower on first floor    Equipment Currently Used at Home  none          Prior Level of Function      Most Recent Value   Ambulation  independent   Transferring  independent   Toileting  independent   Bathing  independent   Dressing  independent   Eating  independent   Communication  understands/communicates without difficulty   Swallowing  swallows foods/liquids without difficulty   Equipment Currently Used at Home  none   Prior Functional Level Comment  PTA pt is independent in ADL/IADL. has SPC does not use. son to place a seat riser on toilet, may have shower chair                 PT Evaluation - 09/23/19 1600        Session Details    Document Type initial evaluation    Mode of Treatment physical therapy       Time Calculation    Start Time 1600    Stop Time 1631    Time Calculation (min) 31 min       General Information    Patient Profile Reviewed? yes    Onset of Illness/Injury or Date of Surgery 09/23/19    Referring Physician vernace    Existing Precautions/Restrictions cardiac;fall;weight bearing       Orientation Log    Name of [facility] 3-->spontaneous/free recall    Year 3-->spontaneous/free recall       Cognition/Psychosocial    Safety Awareness intact       Attention    Behavioral Observations WFL       Sensory    Sensory General Assessment no sensation deficits identified   B LE       Range of Motion (ROM)    General Range of Motion --   R hip/ankle wnl, knee decreased, L LE wnl       Manual Muscle Testing (MMT)    General MMT Assessment --   R hip 2/5, R ankle DF/PF 4/5, can perform R quad set, L LE 5/5       Bed Mobility    [place], Supine to Sit moderate assist (50% patient effort)    Assistive Device (Bed Mobility) bed rails;head of bed elevated       Sit to Stand Transfer    [place], Sit to Stand Transfer moderate assist (50% patient effort);2 person assist    Assistive Device walker, front-wheeled    Comment increased time required  for mobility       Stand to Sit Transfer    [place], Stand to Sit Transfer moderate assist (50% patient effort);2 person assist    Assistive Device walker, front-wheeled       Gait Training    [place], Gait minimum assist (75% or more patient effort)    Assistive Device walker, front-wheeled    Distance in Feet 44 feet    Gait Pattern Utilized step-to    Gait Deviations Identified antalgic;decreased mere    Maintains Weight Bearing Status able to maintain weight bearing status       Stairs Training    Comment nt       AM-PAC (TM) - Mobility (Current Function)    Turning from your back to your side while in a flat bed without using bedrails? 3 - A Little    Moving from lying on your back to sitting on the side of a flat bed without using bedrails? 3 - A Little    Moving to and from a bed to a chair? 3 - A Little    Standing up from a chair using your arms? 3 - A Little    To walk in a hospital room? 3 - A Little    Climbing 3-5 steps with a railing? 3 - A Little    AM-PAC (TM) Mobility Score 18       PT Clinical Impression    Patient's Goals For Discharge --   snf, then home    Plan For Care Reviewed: Physical Therapy PT plan for care discussed with patient    Impairments Found (PT Eval) aerobic capacity/endurance;gait, locomotion, and balance    Rehab Potential/Prognosis good, to achieve stated therapy goals    PT Frequency of Treatment 5-7 times per week    Anticipated Equipment Needs at Discharge none   rehab    PT Recommended Discharge Disposition skilled nursing facility    Daily Outcome Statement pt requires assistance for all mobility, continue PT to increase level of mobility                        Education provided this session. See the Patient Education summary report for full details.    PT Care Plan Goals      Most Recent Value   Bed Mobility Goal   Date Goal Established: Bed Mobility  09/23/19   Time to Achieve Goal: Bed Mobility  by discharge   Goal Activity: Bed Mobility  all bed mobility  activities   Level of [place] Goal: Bed Mobility  modified independence   Gait Goal   Date Goal Established: Gait Training  09/23/19   Time to Achieve Goal: Gait Training  by discharge   Level of [place]  modified independence   Assistive Device: Gait Training  walker, front-wheeled   Distance Goal: Gait Training (feet)  100 feet   Transfer Goal   Date Goal Established: Transfer Training  09/23/19   Time to Achieve Goal: Transfer Training  by discharge   Goal Activity: Transfer Training  sit to stand/stand to sit   Level of [place] Goal: Transfer Training  modified independence   Assistive Device: Transfer Training  walker, front-wheeled

## 2019-09-23 NOTE — HOSPITAL COURSE
Milly is a 74 y.o. female admitted on 9/23/2019 with Unilateral primary osteoarthritis, right knee [M17.11]  S/P total knee arthroplasty, right [Z96.651]. Principal problem is S/P total knee arthroplasty, right.    Milly has a past medical history of Anemia; Arthritis; Heart palpitations; Hypothyroidism; Lipid disorder; Low back problem; Rosacea; Seasonal allergies; and Sleep apnea.     S/p R TKA

## 2019-09-23 NOTE — H&P (VIEW-ONLY)
[facility] Medicine Service -  Pre-Operative Consultation       Patient Name: Milly Shepherd  Referring Surgeon: Dr. Otilio Fernandez    Reason for Referral: Pre-Operative Evaluation  Surgical Procedure: Right knee replacement  Operative Date: 9/23/2019  Other Providers:      PCP: Corey Michelle DO          HISTORY OF PRESENT ILLNESS      Milly Shepherd is a 74 y.o. female presenting today to the [facility] at [facility] for pre-operative evaluation prior to planned surgery.    States has had pain in her right knee for approxinately 7 years.  Tried management with exercise, pain control and knee injections but pain has progressively worsened in recent months and now starting to interfere with her mobility, activity levels.  Orthopedic evaluation revealed her to have bone-on-bone arthritis in her right knee so surgery recommended.    In regards to medical history:      The patient denies any current or recent chest pain or pressure, dyspnea, cough, sputum, fevers, chills, abdominal pain, nausea, vomiting, diarrhea or other symptoms. No urinary symptoms, no BRBPR or melena    Functionally, the patient is able to ascend a flight or so of stairs with no dyspnea or chest pain.     States she is compliant with her CPAP use.    Does have a hx of SVT which causes her occasional palpitations but none in several months.    The patient denies, on specific questioning, the following:  No history of MI or CHF.  No history of DVT/PE.  No history of COPD.  No history of CVA.  No history of DM.   No history of CKD.     PAST MEDICAL AND SURGICAL HISTORY      Past Medical History:   Diagnosis Date   • Anemia     in past   • Arthritis    • Depression     never on meds   • Heart palpitations     none recently   followed by cardiologist   • Hypothyroidism    • Lipid disorder    • Low back problem     occ back pain   arthritis    • Rosacea    • Seasonal allergies    • Sleep apnea   "   has cpap       Past Surgical History:   Procedure Laterality Date   • COLONOSCOPY     • DILATION AND CURETTAGE OF UTERUS     • HYSTERECTOMY         MEDICATIONS        Current Outpatient Prescriptions:   •  aspirin 81 mg enteric coated tablet, Take 81 mg by mouth daily. Plans to stop preop, Disp: , Rfl:   •  cholecalciferol, vitamin D3, (VITAMIN D3) 2,000 unit capsule, Take 2,000 Units by mouth daily., Disp: , Rfl:   •  doxycycline hyclate (VIBRAMYCIN) 50 mg capsule, Take 50 mg by mouth every evening., Disp: , Rfl:   •  levothyroxine (SYNTHROID) 175 mcg tablet, Take 175 mcg by mouth 4 (four) times a week. MTWT, Disp: , Rfl:   •  levothyroxine (SYNTHROID) 200 mcg tablet, Take 200 mcg by mouth 3 (three) times a week (Mon, Wed, Fri)., Disp: , Rfl:   •  loratadine (CLARITIN) 10 mg tablet, Take 10 mg by mouth every evening., Disp: , Rfl:   •  rosuvastatin (CRESTOR) 10 mg tablet, Take 10 mg by mouth nightly., Disp: , Rfl:     ALLERGIES      Patient has no known allergies.    FAMILY HISTORY      No pertinent family history    Denies any prior known family history of DVTs/PEs/clotting disorder    SOCIAL HISTORY      Social History   Substance Use Topics   • Smoking status: Former Smoker     Packs/day: 1.00     Years: 5.00     Types: Cigarettes   • Smokeless tobacco: Never Used      Comment: quit 40 yrs ago   • Alcohol use Yes      Comment: 1x/ month       REVIEW OF SYSTEMS      All other systems reviewed and negative except as noted in HPI    PHYSICAL EXAMINATION      /66 (BP Location: Right upper arm, Patient Position: Sitting)   Pulse 90   Temp 36.2 °C (97.1 °F) (Temporal)   Resp 20   Ht 1.727 m (5' 8\")   Wt 106 kg (233 lb)   SpO2 96%   Breastfeeding? No   BMI 35.43 kg/m²   Body mass index is 35.43 kg/m².    Physical Exam   Constitutional: She is oriented to person, place, and time. No distress.   HENT:   Head: Normocephalic and atraumatic.   Eyes: Pupils are equal, round, and reactive to light. " Conjunctivae are normal.   Neck: Neck supple.   Cardiovascular: Normal rate, regular rhythm and normal heart sounds.    No murmur heard.  Pulmonary/Chest: Effort normal and breath sounds normal. No respiratory distress. She has no wheezes. She has no rales.   Abdominal: Soft. Bowel sounds are normal. She exhibits no distension. There is no tenderness. There is no guarding.   Musculoskeletal:   Right knee limited ROM   Neurological: She is alert and oriented to person, place, and time. No cranial nerve deficit.   Skin: Skin is warm and dry. No rash noted.   Psychiatric: She has a normal mood and affect.       LABS / EKG        Labs  reviewed    Lab Results   Component Value Date     09/03/2019    K 3.7 09/03/2019     09/03/2019    BUN 13 09/03/2019    CREATININE 0.7 09/03/2019    WBC 8.62 09/03/2019    HGB 12.4 09/03/2019    HCT 37.6 09/03/2019     09/03/2019         ECG/Telemetry  EKG 8/29/2019 - sinus rhythm, left anterior sean block    ASSESSMENT AND PLAN         Encounter for other preprocedural examination  Good exercise capacity able to reach >4 METs activity level.  No hx of CAD, no CHF, no TIA/CVA. Does have a hx of SVT  Has been seen by her cardiologist Dr. Marquez for preop cardiovascular evaluation and cleared for surgery  EKG without any signs of active ischemia  Her CBC and BMP within acceptable limits    P:  Can proceed to surgery without additional testing.  NSAID use preop for orthopedic recommendations.    H/O supraventricular tachycardia  With palpitations though pt denies any symptoms in recent months  Monitor post op    DIONY (obstructive sleep apnea)  Continue CPAP nightly and as needed.  Monitor respiratory status closely helena-and postop on DIONY protocol.  Cautious use of narcotics/sedatives.    Hypothyroidism  Continue levothyroxine.    Hyperlipidemia  Continue statin.       In regards to perioperative cardiac risk:  The patient denies any history of ischemic heart disease,  denies any history of CHF, denies any history of CVA, is not on pre-operative treatment with insulin, and does not have a pre-operative creatinine > 2 mg/dL.   The Revised Cardiac Risk Index (RCRI) for this patient indicates 0.4% risk.     Further comments:  Resume supplements when OK with surgical team.  I would encourage incentive spirometry to assist with minimizing helena-operative pulmonary risk.  DVT prophylaxis and timing of such per the discretion of the surgeon.     Please do not hesitate to contact [facility] during the upcoming hospitalization with any questions or concerns.     Tim Alexis MD  9/4/2019

## 2019-09-23 NOTE — ANESTHESIA PROCEDURE NOTES
Spinal Block    Patient location during procedure: OR  Start time: 9/23/2019 9:35 AM  End time: 9/23/2019 9:43 AM  Staffing  Anesthesiologist: TONA HAWTHORNE  Performed: anesthesiologist   Reason for block: at surgeon's request  Preanesthetic Checklist  Completed: patient identified, surgical consent, pre-op evaluation, timeout performed, IV checked, risks and benefits discussed, monitors and equipment checked and sterile field maintained during procedure  Spinal Block  Patient position: sitting  Prep: ChloraPrep and site prepped and draped  Patient monitoring: heart rate, cardiac monitor, continuous pulse ox and blood pressure  Approach: midline  Location: L3-4  Injection technique: single-shot  Needle  Needle type: Sprotte   Needle gauge: 24 G  Needle length: 3.5 in  Assessment  Events: cerebrospinal fluid  Additional Notes  Procedure well tolerated. Vital signs stable.

## 2019-09-23 NOTE — PLAN OF CARE
Problem: Patient Care Overview  Goal: Plan of Care Review  Outcome: Ongoing (interventions implemented as appropriate)   09/23/19 1494   Coping/Psychosocial   Plan Of Care Reviewed With patient;daughter   Plan of Care Review   Outcome Summary OT eval complete      Goal: Individualization & Mutuality  Outcome: Ongoing (interventions implemented as appropriate)      Problem: Knee Arthroplasty (Total, Partial) (Adult)  Goal: Signs and Symptoms of Listed Potential Problems Will be Absent, Minimized or Managed (Knee Arthroplasty)  Outcome: Ongoing (interventions implemented as appropriate)      Problem: Acute Therapy Services Goal & Intervention Plan  Goal: Lower Body Dressing Goal  Outcome: Ongoing (interventions implemented as appropriate)    Goal: Toilet Transfer Goal  Outcome: Ongoing (interventions implemented as appropriate)    Goal: Toileting Goal  Outcome: Ongoing (interventions implemented as appropriate)    Goal: Tub-Shower Transfer Goal  Outcome: Ongoing (interventions implemented as appropriate)

## 2019-09-23 NOTE — CONSULTS
Hospital Medicine Service -  Daily Progress Note       SUBJECTIVE   Interval History: Patient seen and examined in PACU.  Pain controlled.  Denies chest pain, shortness of breath, nausea, vomiting, headaches, dizziness and lightheadedness..     OBJECTIVE      Vital signs in last 24 hours:  Temp:  [36.3 °C (97.4 °F)-37.4 °C (99.3 °F)] 36.3 °C (97.4 °F)  Heart Rate:  [68-95] 68  Resp:  [12-20] 12  BP: (101-128)/(55-79) 108/61    Intake/Output Summary (Last 24 hours) at 09/23/19 1334  Last data filed at 09/23/19 1127   Gross per 24 hour   Intake              800 ml   Output                0 ml   Net              800 ml       PHYSICAL EXAMINATION      Physical Exam   General: Alert and oriented ×3, not in acute distress.  HEENT: Normocephalic atraumatic.   Cardiovascular: S1, S2 without S3 or S4.  There is no murmurs, rubs, or gallops.  Pulses are 2+.  Neck: No jugular venous distention, no carotid bruits, no thyromegaly.  Pulmonary: Clear to auscultation bilaterally.  There is no wheezing, rhonchi, or crackles.  Abdomen: Soft, bowel sounds are present, nontender, nondistended.  No rebound or guarding.  Extremities: No edema, cyanosis or lymphadenopathy.  Neurologic examination: Cranial nerves II through XII are grossly intact.   Skin: Skin is warm, dry, well-perfused.  Musculoskeletal:  Dressing CDI.  Psychiatric: Appropriate mood and affect.  Normal insight and cognition.  No hallucinations, delusions, or suicidal thoughts.     LINES, CATHETERS, DRAINS, AIRWAYS, AND WOUNDS   Lines, Drains, Airways, Wounds:  Peripheral IV 09/23/19 Left Hand (Active)   Number of days: 0       Surgical Incision Knee Right (Active)   Number of days: 0       Comments:      LABS / IMAGING / TELE      Labs  Preop labs from 9/3 reviewed.  Unremarkable.    Imaging  I have independently reviewed the pertinent imaging from the last 24 hrs.    ECG/Telemetry  PACU telemetry reviewed.  Sinus rhythm.    ASSESSMENT AND PLAN      * S/P total knee  arthroplasty, right   Assessment & Plan    POD 0: S/P right total knee arthroplasty  DVT prophylaxis per Ortho  Pain control per Ortho  PT and OT postop  Incentive spirometry  Bowel regimen  Follow labs     Hyperlipidemia   Assessment & Plan    Continue statin     Hypothyroidism   Assessment & Plan    Continue Synthroid     DIONY (obstructive sleep apnea)   Assessment & Plan    Continue CPAP  DIONY protocol  Caution with sedative medications.     H/O supraventricular tachycardia   Assessment & Plan    Recommend monitoring on telemetry.          VTE Assessment: [place]    VTE Prophylaxis Plan: Per Ortho  Disposition Planning: Per Ortho     ZENA Duran  9/23/2019

## 2019-09-23 NOTE — PLAN OF CARE
Problem: Patient Care Overview  Goal: Plan of Care Review  Outcome: Ongoing (interventions implemented as appropriate)   09/23/19 3982   Coping/Psychosocial   Plan Of Care Reviewed With patient   Plan of Care Review   Progress no change   Outcome Summary Pt oriented to unit. Plan of care explained. [id number]     Goal: Individualization & Mutuality  Outcome: Ongoing (interventions implemented as appropriate)      Problem: Fall Risk (Adult)  Goal: Identify Related Risk Factors and Signs and Symptoms  Outcome: Outcome(s) Achieved Date Met: 09/23/19    Goal: Absence of Falls  Outcome: Ongoing (interventions implemented as appropriate)      Problem: Knee Arthroplasty (Total, Partial) (Adult)  Goal: Signs and Symptoms of Listed Potential Problems Will be Absent, Minimized or Managed (Knee Arthroplasty)  Outcome: Ongoing (interventions implemented as appropriate)    Goal: Anesthesia/Sedation Recovery  Outcome: Outcome(s) Achieved Date Met: 09/23/19

## 2019-09-23 NOTE — PROGRESS NOTES
Patient: Milly Shepherd  Location: [facility] 5PAV 5416  MRN: [id number]  Today's date: 9/23/2019  Pt left with PT at end of OT session, NAD, call bell and all stated needs in reach.   Hospital Course  Milly is a 74 y.o. female admitted on 9/23/2019 with Unilateral primary osteoarthritis, right knee [M17.11]  S/P total knee arthroplasty, right [Z96.651]. Principal problem is S/P total knee arthroplasty, right.    Milly has a past medical history of Anemia; Arthritis; Heart palpitations; Hypothyroidism; Lipid disorder; Low back problem; Rosacea; Seasonal allergies; and Sleep apnea.     S/p R TKA           Therapy Pain/Vitals - 09/23/19 1558        Pain/Comfort/Sleep    Pain Charting Type Pain Assessment    Presence of Pain complains of pain/discomfort    Preferred Pain Scale number (Numeric Rating Pain Scale)    Pain Body Location - Side Right    Pain Body Location knee    Pain Rating (0-10): Rest 3    Pain Rating (0-10): Activity 3    Pain Management Interventions position adjusted       Vital Signs    Pulse 81    SpO2 96 %    Patient Activity At rest    Oxygen Therapy None (Room air)    BP (!)  109/58    BP Location Right upper arm    BP Method Automatic    Patient Position Lying          Prior Living Environment      Most Recent Value   Lives With  alone   Living Arrangements  house   Living Environment Comment  1SH, 2 KAREN with railing. walk in shower on first floor    Equipment Currently Used at Home  none          Prior Level of Function      Most Recent Value   Ambulation  independent   Transferring  independent   Toileting  independent   Bathing  independent   Dressing  independent   Eating  independent   Communication  understands/communicates without difficulty   Swallowing  swallows foods/liquids without difficulty   Equipment Currently Used at Home  none   Prior Functional Level Comment  PTA pt is independent in ADL/IADL. has SPC does not use. son to place a seat riser on toilet, may have shower  chair                 OT Evaluation - 09/23/19 155        Session Details    Document Type initial evaluation    Mode of Treatment occupational therapy    Patient/Family Observations pt in supine, daughter present        Time Calculation    Start Time 1558    Stop Time 1618    Time Calculation (min) 20 min       General Information    Patient Profile Reviewed? yes    Existing Precautions/Restrictions fall;cardiac;weight bearing       Weight Bearing Status    Left LE Weight Bearing Status weight bearing as tolerated       Orientation Log    Comment AOX4       Cognition/Psychosocial    Safety Awareness intact       Attention    Behavioral Observations WFL       Sensory    Sensory General Assessment no sensation deficits identified       Vision Assessment/Intervention    Visual Impairment/Limitations WFL       Range of Motion (ROM)    Comment, General Range of Motion BUEs are WFL       Manual Muscle Testing (MMT)    Comment pt is tired, generally decreased strength in BUEs ~4/5       Bed Mobility    [place], Supine to Sit moderate assist (50% patient effort)    Comment (Bed Mobility) for LE management and trunk control        Sit to Stand Transfer    [place], Sit to Stand Transfer moderate assist (50% patient effort);2 person assist    Assistive Device walker, front-wheeled    Comment mod a x2 for sit to stand with cues for safety and technique. completed functional mobility with extra time       Stand to Sit Transfer    [place], Stand to Sit Transfer moderate assist (50% patient effort);2 person assist    Assistive Device walker, front-wheeled    Comment HHA x2, generally decreased strength to perform reach back with cues for technique        Upper Body Dressing    Upper Body Dressing Tasks don;hospital gown    Upper Body Dressing [place] minimum assist (75% or more patient effort)    Comment at eob        Lower Body Dressing    Lower Body Dressing Tasks don;socks    Lower Body Dressing  [place] dependent (less than 25% patient effort)    Comment dependent overall for LB dressing part practice due to decreased flexibility       AM-PAC (TM) - ADL (Current Function)    Putting on and taking off regular lower body clothing? 1 - Total    Bathing? 2 - A Lot    Toileting? 2 - A Lot    Putting on/taking off regular upper body clothing? 3 - A Little    How much help for taking care of personal grooming? 3 - A Little    Eating meals? 4 - None    AM-PAC (TM) ADL Score 15       OT Clinical Impression    Patient's Goals For Discharge return home;return to all previous roles/activities    Rehab Potential/Prognosis: Occupational Therapy good, to achieve stated therapy goals    OT Frequency of Treatment 5 times per week    Anticipated Equipment Needs at Discharge bathing equipment;bedside commode;dressing equipment;tub bench;shower chair    OT Recommended Discharge Disposition skilled nursing facility    Daily Outcome Statement OT eval complete, pt is a 75 y/o female seen pod0 s/p R TKA presenting with deficits in ADLs compared to baseline, rec ongoing OT to promote safety and independence in ADLs           Pain Assessment/Intervention  Pain Charting Type: Pain Assessment             Education provided this session. See the Patient Education summary report for full details.    OT Care Plan Goals      Most Recent Value   Lower Body Dressing Goal   Date Goal Established: Lower Body Dressing  09/23/19   Time to Achieve Goal: Lower Body Dressing  5 days   Level of [place]  minimum assist (75% or more patient effort)   Goal Outcome: Lower Body Dressing  goal ongoing   Toilet Transfer Goal   Date Goal Established: Toilet Transfer Training  09/23/19   Time to Achieve Goal: Toilet Transfer Training  5 days   Level of [place] Goal: Toilet Transfer Training  minimum assist (75% or more patient effort)   Goal Outcome: Toilet Transfer Training  goal ongoing   Toileting Goal   Date Goal Established: Toileting   09/23/19   Time to Achieve Goal: Toileting  5 days   Level of [place] Goal: Toileting  minimum assist (75% patient effort)   Goal Outcome: Toileting  goal ongoing   Tub-Shower Transfer Goal   Date Goal Established: Tub-Shower Transfer Training  09/23/19   Time to Achieve Goal: Tub-Shower Transfer Training  5 days   Level of [place] Goal: Tub-Shower Transfer Training  minimum assist (75% or more patient effort)   Goal Outcome: Tub-Shower Transfer Training  goal ongoing

## 2019-09-23 NOTE — ANESTHESIA PREPROCEDURE EVALUATION
Anesthesia ROS/MED HX    Anesthesia History    Previous anesthetics  Pulmonary    Sleep apnea and CPAP Compliant  Neuro/Psych - neg  Cardiovascular   ECG reviewed and cardiac clearance reviewed  GI/Hepatic- neg  Musculoskeletal   Osteoarthritis  Endo/Other   Hypothyroidism      Past Surgical History:   Procedure Laterality Date   • COLONOSCOPY     • DILATION AND CURETTAGE OF UTERUS     • HYSTERECTOMY         Physical Exam    Airway   Mallampati: II   TM distance: <3 FB   Neck ROM: full  Cardiovascular - normal   Rhythm: regular   Rate: normal  Pulmonary - normal   clear to auscultation  Dental - normal        Anesthesia Plan    Plan: spinal    Technique: spinal     Lines and Monitors: PIV     Airway: natural airway / supplemental oxygen   ASA 3  Blood Products:     Use of Blood Products Discussed: Yes   Anesthetic plan and risks discussed with: patient  Induction:    intravenous   Postop Plan:   Patient Disposition: inpatient floor planned admission   Pain Management: IV analgesics and spinal

## 2019-09-23 NOTE — OP NOTE
Patient Name: Milly Shepherd  MRN #: [id number]  : 1945  Admit Date: 2019  Procedure Date: 19    PREOPERATIVE DIAGNOSIS: Osteoarthritis, right knee.    POSTOPERATIVE DIAGNOSIS: Osteoarthritis, right knee.     PROCEDURE PERFORMED: Total knee arthroplasty. Early Triathlon implant size 4 femur, 4 tibia, 9csmm tibial spacer, and a 35 mm patellar button.    SURGEON: Otilio Fernandez MD.    ASSISTANT: river    ANESTHESIA: spinal    COMPLICATIONS: None.    SPECIMENS: None    BLOOD LOSS. None    DESCRIPTION OF PROCEDURE: The patient was seen in the preoperative area and the chart was reviewed and signed.  Knee was marked with my initials.  Patient was brought back to the operative suite, placed under anesthesia.  A tourniquet was placed on the upper thigh.  Prepping and draping was carried out in the usual fashion for total knee replacement in laminar airflow room.  Timeout was performed.  Antibiotic administration was confirmed.  Tourniquet was inflated to 300 mmHg.     This patient's BMI was above 35 which neccesitated a longer surgical incision, longer surgical time and closure with more difficult exposure.  Time was increased by more than 50%.  In addition by the orthopaedic literature, there is a higher risk of post-operative complications to be addressed in the 90 day global surgical period.     Direct anterior incision was made.    A synovectomy was then carried out.    The knee was flexed.  An intramedullary amanda was placed and a distal femoral cut was made.  The knee was brought to full extension and a miroslava was placed across the proximal tibia, parallel to the distal femoral cut.    The knee was flexed again, the tibia was subluxed and the ACL and menisci were excised.  PCL was retained.    The tibial plateau cut was then made.  The 4-in-1 guide was used to cut the distal femur.  Trial reductions were carried out and there was excellent range of motion, full extension, excellent stability and no  ligament balancing was necessary.    The patella was then resurfaced.    All the trials were removed.  Copious irrigation was carried out and the implants were placed in a noncemented fashion and were very stable.  X    The knee was brought to full extension.  A combination of 0.5% Marcaine and epinephrine, Duramorph, Depo-Medrol was injected into all the soft tissues.  Copious irrigation was carried out and the wound was then closed in layers with 2-0 Quill in the fascial layer, 2-0 Vicryl in the subcutaneous tissue, and 3-0 running Monocryl in the skin with Dermabond.  A Mepilex dressing was placed.  Patient was taken to recovery room in satisfactory condition and tolerated the procedure well.  Sponge, needle, and instrument count correct ×2.    JEWELS ARMIJO MD

## 2019-09-24 LAB
ANION GAP SERPL CALC-SCNC: 6 MEQ/L (ref 3–15)
BUN SERPL-MCNC: 17 MG/DL (ref 8–20)
CALCIUM SERPL-MCNC: 8.4 MG/DL (ref 8.9–10.3)
CHLORIDE SERPL-SCNC: 105 MEQ/L (ref 98–109)
CO2 SERPL-SCNC: 27 MEQ/L (ref 22–32)
CREAT SERPL-MCNC: 0.7 MG/DL
ERYTHROCYTE [DISTWIDTH] IN BLOOD BY AUTOMATED COUNT: 13.1 % (ref 11.7–14.4)
GFR SERPL CREATININE-BSD FRML MDRD: >60 ML/MIN/1.73M*2
GLUCOSE SERPL-MCNC: 134 MG/DL (ref 70–99)
HCT VFR BLDCO AUTO: 36.4 %
HGB BLD-MCNC: 11.6 G/DL
MCH RBC QN AUTO: 28 PG (ref 28–33.2)
MCHC RBC AUTO-ENTMCNC: 31.9 G/DL (ref 32.2–35.5)
MCV RBC AUTO: 87.7 FL (ref 83–98)
PDW BLD AUTO: 10.4 FL (ref 9.4–12.3)
PLATELET # BLD AUTO: 231 K/UL
POTASSIUM SERPL-SCNC: 4.5 MEQ/L (ref 3.6–5.1)
RBC # BLD AUTO: 4.15 M/UL (ref 3.93–5.22)
SODIUM SERPL-SCNC: 138 MEQ/L (ref 136–144)
WBC # BLD AUTO: 13.07 K/UL

## 2019-09-24 PROCEDURE — 36415 COLL VENOUS BLD VENIPUNCTURE: CPT | Performed by: NURSE PRACTITIONER

## 2019-09-24 PROCEDURE — 25800000 HC PHARMACY IV SOLUTIONS: Performed by: NURSE PRACTITIONER

## 2019-09-24 PROCEDURE — 12000000 HC ROOM AND CARE MED/SURG

## 2019-09-24 PROCEDURE — 63600000 HC DRUGS/DETAIL CODE: Performed by: NURSE PRACTITIONER

## 2019-09-24 PROCEDURE — 97150 GROUP THERAPEUTIC PROCEDURES: CPT | Mod: GO

## 2019-09-24 PROCEDURE — 63700000 HC SELF-ADMINISTRABLE DRUG

## 2019-09-24 PROCEDURE — 97150 GROUP THERAPEUTIC PROCEDURES: CPT | Mod: GP

## 2019-09-24 PROCEDURE — 97116 GAIT TRAINING THERAPY: CPT | Mod: GP

## 2019-09-24 PROCEDURE — 63700000 HC SELF-ADMINISTRABLE DRUG: Performed by: NURSE PRACTITIONER

## 2019-09-24 PROCEDURE — 85027 COMPLETE CBC AUTOMATED: CPT | Performed by: NURSE PRACTITIONER

## 2019-09-24 PROCEDURE — 99232 SBSQ HOSP IP/OBS MODERATE 35: CPT | Performed by: HOSPITALIST

## 2019-09-24 PROCEDURE — 80048 BASIC METABOLIC PNL TOTAL CA: CPT | Performed by: NURSE PRACTITIONER

## 2019-09-24 PROCEDURE — 97530 THERAPEUTIC ACTIVITIES: CPT | Mod: GP

## 2019-09-24 PROCEDURE — 97535 SELF CARE MNGMENT TRAINING: CPT | Mod: GO

## 2019-09-24 RX ADMIN — ACETAMINOPHEN 650 MG: 325 TABLET, FILM COATED ORAL at 02:47

## 2019-09-24 RX ADMIN — OXYCODONE HYDROCHLORIDE 10 MG: 5 TABLET ORAL at 09:04

## 2019-09-24 RX ADMIN — LEVOTHYROXINE SODIUM 175 MCG: 175 TABLET ORAL at 05:57

## 2019-09-24 RX ADMIN — CETIRIZINE HYDROCHLORIDE 5 MG: 5 TABLET ORAL at 22:18

## 2019-09-24 RX ADMIN — SODIUM CHLORIDE: 9 INJECTION, SOLUTION INTRAVENOUS at 02:48

## 2019-09-24 RX ADMIN — OXYCODONE HYDROCHLORIDE 10 MG: 5 TABLET ORAL at 20:41

## 2019-09-24 RX ADMIN — ASPIRIN 81 MG 81 MG: 81 TABLET ORAL at 09:03

## 2019-09-24 RX ADMIN — ROSUVASTATIN CALCIUM 10 MG: 10 TABLET, FILM COATED ORAL at 22:18

## 2019-09-24 RX ADMIN — OXYCODONE HYDROCHLORIDE 10 MG: 5 TABLET ORAL at 15:57

## 2019-09-24 RX ADMIN — DEXAMETHASONE SODIUM PHOSPHATE 8 MG: 4 INJECTION, SOLUTION INTRA-ARTICULAR; INTRALESIONAL; INTRAMUSCULAR; INTRAVENOUS; SOFT TISSUE at 05:56

## 2019-09-24 RX ADMIN — ASPIRIN 81 MG 81 MG: 81 TABLET ORAL at 20:41

## 2019-09-24 RX ADMIN — ACETAMINOPHEN 650 MG: 325 TABLET, FILM COATED ORAL at 09:03

## 2019-09-24 RX ADMIN — KETOROLAC TROMETHAMINE 15 MG: 15 INJECTION, SOLUTION INTRAMUSCULAR; INTRAVENOUS at 20:42

## 2019-09-24 RX ADMIN — ACETAMINOPHEN 650 MG: 325 TABLET, FILM COATED ORAL at 14:20

## 2019-09-24 RX ADMIN — KETOROLAC TROMETHAMINE 15 MG: 15 INJECTION, SOLUTION INTRAMUSCULAR; INTRAVENOUS at 09:04

## 2019-09-24 RX ADMIN — KETOROLAC TROMETHAMINE 15 MG: 15 INJECTION, SOLUTION INTRAMUSCULAR; INTRAVENOUS at 14:20

## 2019-09-24 RX ADMIN — SENNOSIDES AND DOCUSATE SODIUM 1 TABLET: 8.6; 5 TABLET ORAL at 20:41

## 2019-09-24 RX ADMIN — ACETAMINOPHEN 650 MG: 325 TABLET, FILM COATED ORAL at 20:41

## 2019-09-24 RX ADMIN — SODIUM CHLORIDE 2 G: 9 INJECTION, SOLUTION INTRAVENOUS at 02:48

## 2019-09-24 RX ADMIN — OXYCODONE HYDROCHLORIDE 10 MG: 5 TABLET ORAL at 02:54

## 2019-09-24 RX ADMIN — SENNOSIDES AND DOCUSATE SODIUM 1 TABLET: 8.6; 5 TABLET ORAL at 09:03

## 2019-09-24 RX ADMIN — KETOROLAC TROMETHAMINE 15 MG: 15 INJECTION, SOLUTION INTRAMUSCULAR; INTRAVENOUS at 02:48

## 2019-09-24 RX ADMIN — DOXYCYCLINE HYCLATE 50 MG: 50 CAPSULE ORAL at 18:10

## 2019-09-24 RX ADMIN — POLYETHYLENE GLYCOL 3350 17 G: 17 POWDER, FOR SOLUTION ORAL at 09:02

## 2019-09-24 ASSESSMENT — COGNITIVE AND FUNCTIONAL STATUS - GENERAL
WALKING IN HOSPITAL ROOM: 3 - A LITTLE
DRESSING REGULAR LOWER BODY CLOTHING: 3 - A LITTLE
EATING MEALS: 4 - NONE
HELP NEEDED FOR BATHING: 3 - A LITTLE
HELP NEEDED FOR PERSONAL GROOMING: 3 - A LITTLE
STANDING UP FROM CHAIR USING ARMS: 4 - NONE
TOILETING: 3 - A LITTLE
DRESSING REGULAR UPPER BODY CLOTHING: 4 - NONE
MOVING TO AND FROM BED TO CHAIR: 3 - A LITTLE
CLIMB 3 TO 5 STEPS WITH RAILING: 3 - A LITTLE

## 2019-09-24 NOTE — PROGRESS NOTES
[place] and [facility] can accept patient. Patient notified. Will make final decision on facility tomorrow. Aware of transport options family vs [facility]. Aware [facility] WCV cost 65$ then 4$ per mile. Will make that decision tomorrow also.

## 2019-09-24 NOTE — PROGRESS NOTES
Hospital Medicine Service -  Daily Progress Note       SUBJECTIVE   Interval History: Denies chest pain and shortness of breath.  No nausea or vomiting.     OBJECTIVE      Vital signs in last 24 hours:  Temp:  [36.2 °C (97.2 °F)-36.8 °C (98.3 °F)] 36.8 °C (98.3 °F)  Heart Rate:  [68-96] 71  Resp:  [10-18] 17  BP: (101-128)/(55-66) 119/58    Intake/Output Summary (Last 24 hours) at 09/24/19 1130  Last data filed at 09/24/19 0300   Gross per 24 hour   Intake                0 ml   Output              950 ml   Net             -950 ml       PHYSICAL EXAMINATION      GEN: well-developed and well-nourished; not in acute distress  HEENT: normocephalic; atraumatic  EYES: EOMI  CARDIO: regular rate and rhythm  RESP: clear to auscultation bilaterally; no rales, rhonchi, or wheezes  ABD: soft, non-distended, non-tender, normal bowel sounds  EXT: right knee dressing intact  SKIN: No Rash exposed skin  NEURO: alert and oriented x 3; nonfocal  BEHAVIOR/EMOTIONAL: appropriate; cooperative           LINES, CATHETERS, DRAINS, AIRWAYS, AND WOUNDS   Lines, Drains, Airways, Wounds:  Peripheral IV 09/23/19 Left Hand (Active)   Number of days: 1       Surgical Incision Knee Right (Active)   Number of days: 1       Comments:      LABS / IMAGING / TELE      Labs    Results from last 7 days  Lab Units 09/24/19  0300   WBC K/uL 13.07*   HEMOGLOBIN g/dL 11.6*   HEMATOCRIT % 36.4   PLATELETS K/uL 231       Results from last 7 days  Lab Units 09/24/19  0300   SODIUM mEQ/L 138   POTASSIUM mEQ/L 4.5   CHLORIDE mEQ/L 105   CO2 mEQ/L 27   BUN mg/dL 17   CREATININE mg/dL 0.7   GLUCOSE mg/dL 134*   CALCIUM mg/dL 8.4*         Imaging  I have independently reviewed the pertinent imaging from the last 24 hrs.        ASSESSMENT AND PLAN      * S/P total knee arthroplasty, right   Assessment & Plan    DVT prophylaxis and pain management as per Ortho  Recommend PT/OT and incentive spirometry     Hyperlipidemia   Assessment & Plan    Continue statin      Hypothyroidism   Assessment & Plan    Continue Synthroid     DIONY (obstructive sleep apnea)   Assessment & Plan    Continue CPAP  Caution with sedative medications.     H/O supraventricular tachycardia   Assessment & Plan    Recommend monitoring on telemetry.            Code Status: Full Code  Estimated Discharge Date: 9/26/2019       Catalino Long MD  9/24/2019     Case discussed with patient

## 2019-09-24 NOTE — PLAN OF CARE
Problem: Patient Care Overview  Goal: Plan of Care Review  Outcome: Ongoing (interventions implemented as appropriate)   09/24/19 0315   Coping/Psychosocial   Plan Of Care Reviewed With patient   Plan of Care Review   Progress improving   Outcome Summary Patient oob to commode with assist of one. requires prompting.      Goal: Individualization & Mutuality  Outcome: Ongoing (interventions implemented as appropriate)   09/24/19 0315   Individualization   Patient Specific Preferences pt likes to sleep with lights off and door closed       Problem: Fall Risk (Adult)  Goal: Absence of Falls  Outcome: Ongoing (interventions implemented as appropriate)

## 2019-09-24 NOTE — PLAN OF CARE
Problem: Patient Care Overview  Goal: Plan of Care Review  Outcome: Ongoing (interventions implemented as appropriate)   09/24/19 6445   Coping/Psychosocial   Plan Of Care Reviewed With patient   Plan of Care Review   Progress improving   Outcome Summary Patient 's pain managed. OOB with 1 assist using rolling walker. Participated in therapy and did well. Voiding adequate amounts of urine.      Goal: Individualization & Mutuality  Outcome: Ongoing (interventions implemented as appropriate)      Problem: Fall Risk (Adult)  Goal: Absence of Falls  Outcome: Ongoing (interventions implemented as appropriate)      Problem: Knee Arthroplasty (Total, Partial) (Adult)  Goal: Signs and Symptoms of Listed Potential Problems Will be Absent, Minimized or Managed (Knee Arthroplasty)  Outcome: Ongoing (interventions implemented as appropriate)

## 2019-09-24 NOTE — PLAN OF CARE
Problem: Patient Care Overview  Goal: Plan of Care Review  Outcome: Ongoing (interventions implemented as appropriate)   09/24/19 4868   Coping/Psychosocial   Plan Of Care Reviewed With patient   Plan of Care Review   Progress progress toward functional goals as expected   Outcome Summary PT session completed

## 2019-09-24 NOTE — PROGRESS NOTES
Patient: Milly Shepherd  Location: [facility] 5PAV 5416  MRN: [id number]  Today's date: 9/24/2019    Pt left seated in recliner chair in room, call bell and personal items w/in reach, daughter present    Hospital Course  Milly is a 74 y.o. female admitted on 9/23/2019 with Unilateral primary osteoarthritis, right knee [M17.11]  S/P total knee arthroplasty, right [Z96.651]. Principal problem is S/P total knee arthroplasty, right.    Milly has a past medical history of Anemia; Arthritis; Heart palpitations; Hypothyroidism; Lipid disorder; Low back problem; Rosacea; Seasonal allergies; and Sleep apnea.     S/p R TKA           Therapy Pain/Vitals - 09/24/19 1001        Pain/Comfort/Sleep    Pain Charting Type Pain Assessment    Presence of Pain complains of pain/discomfort    Preferred Pain Scale number (Numeric Rating Pain Scale)    Pain Body Location - Side Right    Pain Body Location knee    Pain Rating (0-10): Rest 3    Pain Rating (0-10): Activity 5    Pain Management Interventions cold applied;position adjusted;prescribed exercises encouraged          Prior Living Environment      Most Recent Value   Lives With  alone   Living Arrangements  house   Living Environment Comment  1SH, 2 KAREN with railing. walk in shower on first floor    Equipment Currently Used at Home  none          Prior Level of Function      Most Recent Value   Ambulation  independent   Transferring  independent   Toileting  independent   Bathing  independent   Dressing  independent   Eating  independent   Communication  understands/communicates without difficulty   Swallowing  swallows foods/liquids without difficulty   Equipment Currently Used at Home  none   Prior Functional Level Comment  PTA pt is independent in ADL/IADL. has SPC does not use. son to place a seat riser on toilet, may have shower chair                 PT Treatment Summary - 09/24/19 1001        Session Details    Document Type daily treatment    Mode of Treatment  physical therapy;group therapy    Patient/Family Observations Pt received in recliner chair in ortho group       Time Calculation    Start Time 1000    Stop Time 1130    Time Calculation (min) 90 min       General Information    Patient Profile Reviewed? yes    Onset of Illness/Injury or Date of Surgery 09/23/19    Referring Physician Vern[place]    Pertinent History of Current Functional Problem R TKA    Existing Precautions/Restrictions cardiac;fall;weight bearing       Weight Bearing Status    Right LE Weight Bearing Status weight bearing as tolerated       Orientation Log    Comment AAOx3       Cognition/Psychosocial    Safety Awareness intact       Attention    Behavioral Observations WFL       Bed Mobility    Bed Mobility other (see comments)    Comment (Bed Mobility) compelted w/OT in ortho group       Transfers    Maintains Weight Bearing Status (Transfers) able to maintain weight bearing status       Sit to Stand Transfer    [place], Sit to Stand Transfer close supervision;verbal cues    Verbal Cues technique    Assistive Device walker, front-wheeled    Comment very slow, effortful rise, CS for safety       Stand to Sit Transfer    [place], Stand to Sit Transfer supervision;verbal cues    Verbal Cues technique    Assistive Device walker, front-wheeled    Comment cues for controlled descent       Car Transfer    [place], Car Transfer supervision;verbal cues    Verbal Cues technique    Assistive Device walker, front-wheeled    Comment Pt able to demo good understanding of safe transfer, mild increase in pain during exertion       Gait Analysis/Training    Gait/Stairs Locomotion Gait Training (Group);Stairs Training (Group);Curb Management (Group)       Gait Training    [place], Gait supervision;verbal cues    Assistive Device walker, front-wheeled    Distance in Feet 100 feet    Gait Pattern Utilized step-to    Gait Deviations Identified antalgic;decreased mere;decreased heel  strike;decreased step length    Maintains Weight Bearing Status able to maintain weight bearing status    Comment cueing on gait mechanics, pt unable to implement reciprocal pattern 2/2 pain. reported feeling better w/ambulation       Stairs Training    [place], Stairs verbal cues;close supervision    Assistive Device railing;cane, straight    Handrail Location left side (ascending)    Number of Stairs 4    Stair Height 6 inches    Ascending Stairs Technique step-to-step    Descending Stairs Technique step-to-step    Comment Pt demo's good carryover of proper technique, no unsteadiness t/o. Daughter present and educated on how to provide safe supervision assist at home       Curb Management    [place] close supervision;verbal cues;other (see comments)   CG    Assistive Device walker, front-wheeled    Curb Height 6 inches    Comment Completed 2x. First time at CGx1 w/RW, pt highly anxious. Completed 2nd time at CSx1 as pt demo's improved confidence after first trial.       LE Supine Therapeutic Exercise    Exercise(s) Performed hip abduction;quadriceps sets;hamstring sets;gluteal sets;ankle pumps;heel slides (hip/knee flexion/extension)   ankle circles, LAQs    Exercise Type AROM (active range of motion);isotonic contraction, concentric    Expected Outcomes improve functional tolerance, single extremity activity;improve motor control;improve functional stability    Sets/Reps Detail 1x10 each, 3 sec holds for quad sets, ham sets, glute sets and heel slides    Ability to Transfer Skills beginning to transfer skills to functional activity    Comment completed in seated position, edu on mobility at least 1x per hour, demo and tactile cueing on proper technique       AM-PAC (TM) - Mobility (Current Function)    Turning from your back to your side while in a flat bed without using bedrails? 4 - None    Moving from lying on your back to sitting on the side of a flat bed without using bedrails? 4 - None    Moving  to and from a bed to a chair? 3 - A Little    Standing up from a chair using your arms? 4 - None    To walk in a hospital room? 3 - A Little    Climbing 3-5 steps with a railing? 3 - A Little    AM-PAC (TM) Mobility Score 21       PT Clinical Impression    Plan For Care Reviewed: Physical Therapy PT plan for care discussed with patient    System Pathology/Pathophysiology Noted musculoskeletal    Impairments Found (PT Eval) gait, locomotion, and balance;joint integrity and mobility;muscle performance;ROM (range of motion)    Functional Limitations in Following Categories (PT Eval) self-care;home management    Rehab Potential/Prognosis good, to achieve stated therapy goals    PT Frequency of Treatment 5-7 times per week    Problem List decreased flexibility;decreased ROM;decreased strength;impaired balance;pain    Activity Limitations Related to Problem List functional mobility not performed adequately or safely for household activity;functional mobility not performed adequately or safely for community activity    Anticipated Equipment Needs at Discharge none    PT Recommended Discharge Disposition home with home health;home with assist    Daily Outcome Statement 9/24: Pt seen in ortho gym. Completed STS, curb car and navigation of stairs at varying CS-Sx1 t/o. Pt req'd max motivational cueing, limited 2/2 significant anxiety. Demo's good understanding of technique and good tolerance to issued tasks once attempted. Rec DC home pending full-time assist available, when medically cleared          Pain Assessment/Intervention  Pain Charting Type: Pain Assessment             Education provided this session. See the Patient Education summary report for full details.    PT Care Plan Goals      Most Recent Value   Bed Mobility Goal   Date Goal Established: Bed Mobility  09/23/19   Time to Achieve Goal: Bed Mobility  by discharge   Goal Activity: Bed Mobility  all bed mobility activities   Level of [place] Goal: Bed  Mobility  modified independence   Gait Goal   Date Goal Established: Gait Training  09/23/19   Time to Achieve Goal: Gait Training  by discharge   Level of [place]  modified independence   Assistive Device: Gait Training  walker, front-wheeled   Distance Goal: Gait Training (feet)  100 feet   Transfer Goal   Date Goal Established: Transfer Training  09/23/19   Time to Achieve Goal: Transfer Training  by discharge   Goal Activity: Transfer Training  sit to stand/stand to sit   Level of [place] Goal: Transfer Training  modified independence   Assistive Device: Transfer Training  walker, front-wheeled

## 2019-09-24 NOTE — PATIENT CARE CONFERENCE
Care Progression Rounds Note  Date: 9/24/2019  Time: 10:17 AM     Patient Name: Milly Shepherd     Medical Record Number: [id number]   YOB: 1945  Sex: Female      Room/Bed: 5416    Admitting Diagnosis: Unilateral primary osteoarthritis, right knee [M17.11]  S/P total knee arthroplasty, right [Z96.651]   Admit Date/Time: 9/23/2019  7:25 AM    Primary Diagnosis: S/P total knee arthroplasty, right  Principal Problem: S/P total knee arthroplasty, right    GMLOS: pending  Anticipated Discharge Date: 9/26/2019    AM-PAC  Mobility Score: 18    Discharge Planning:  Living Arrangements: house  Concerns To Be Addressed: basic needs concerns  Anticipated Discharge Disposition: skilled nursing facility ([facility] in [place])  Type of Skilled Nursing Care Services: PT, OT, nursing    Barriers to Discharge:  Barriers to Discharge: Therapy update pending    Participants:  advanced practice provider, , nursing, physical therapy, social work/services

## 2019-09-24 NOTE — PLAN OF CARE
Problem: Patient Care Overview  Goal: Discharge Needs Assessment  Outcome: Ongoing (interventions implemented as appropriate)   09/24/19 0103   DC Needs Assessment   Concerns To Be Addressed care coordination/care conferences;discharge planning concerns   Readmission Within The Last 30 Days no previous admission in last 30 days   Anticipated Discharge Disposition skilled nursing facility   Equipment Needed After Discharge walker, rolling   Current Discharge Risk dependent with mobility/activities of daily living;lives alone   Current Health   Anticipated Changes Related to Illness inability to care for self   Activity/Self Care ROS   Equipment Currently Used at Home none   Met with patient and her daughter. Independent with adls prior to admission. Lives alone. Requesting snf 1ST choice is [facility], 2 ND choice [place]. Social work aware and will send referrals.

## 2019-09-24 NOTE — PROGRESS NOTES
Patient: Milly Shepherd  Location: [facility] 5PAV 5416  MRN: [id number]  Today's date: 9/24/2019   Patient sitting in recliner  In ortho gym class     Hospital Course  Milly is a 74 y.o. female admitted on 9/23/2019 with Unilateral primary osteoarthritis, right knee [M17.11]  S/P total knee arthroplasty, right [Z96.651]. Principal problem is S/P total knee arthroplasty, right.    Milly has a past medical history of Anemia; Arthritis; Heart palpitations; Hypothyroidism; Lipid disorder; Low back problem; Rosacea; Seasonal allergies; and Sleep apnea.     S/p R TKA           Therapy Pain/Vitals - 09/24/19 1032        Pain/Comfort/Sleep    Pain Charting Type Pain Assessment    Presence of Pain complains of pain/discomfort    Preferred Pain Scale word (verbal rating pain scale)    Pain Body Location - Side Right    Pain Body Location knee    Pain Rating: Rest 2 - mild pain    Pain Rating: Activity 2 - mild pain    Pain Management Interventions position adjusted          Prior Living Environment      Most Recent Value   Lives With  alone   Living Arrangements  house   Living Environment Comment  1SH, 2 KAREN with railing. walk in shower on first floor    Equipment Currently Used at Home  none          Prior Level of Function      Most Recent Value   Ambulation  independent   Transferring  independent   Toileting  independent   Bathing  independent   Dressing  independent   Eating  independent   Communication  understands/communicates without difficulty   Swallowing  swallows foods/liquids without difficulty   Equipment Currently Used at Home  none   Prior Functional Level Comment  PTA pt is independent in ADL/IADL. has SPC does not use. son to place a seat riser on toilet, may have shower chair                 OT Treatment Summary - 09/24/19 1032        Session Details    Document Type daily treatment    Mode of Treatment group therapy;occupational therapy    Patient/Family Observations sitting in recliner in ortho  gym class        Time Calculation    Start Time 1032    Stop Time 1112    Time Calculation (min) 40 min       General Information    Patient Profile Reviewed? yes    Pertinent History of Current Functional Problem R TKR    Existing Precautions/Restrictions cardiac;fall;weight bearing       Weight Bearing Status    Right LE Weight Bearing Status weight bearing as tolerated       Coping Strategies    Trust Relationship/Rapport care explained       Orientation Log    Comment AAO x3        Cognition/Psychosocial    Safety Awareness intact       Sensory    Sensory General Assessment no sensation deficits identified       Range of Motion (ROM)    Comment, General Range of Motion B UE WFL        Manual Muscle Testing (MMT)    Comment B UE WFL        Bed Mobility    Bed Mobility bed mobility activities    [place], Supine to Sit close supervision    Comment (Bed Mobility) flat surface        Transfers    Maintains Weight Bearing Status (Transfers) able to maintain weight bearing status       Bed to Chair Transfer    [place], Bed to Chair close supervision    Assistive Device walker, front-wheeled       Chair to Bed Transfer    [place], Chair to Bed close supervision    Assistive Device walker, front-wheeled       Sit to Stand Transfer    [place], Sit to Stand Transfer close supervision    Verbal Cues hand placement;safety;technique    Assistive Device walker, front-wheeled       Stand to Sit Transfer    [place], Stand to Sit Transfer close supervision    Verbal Cues hand placement;safety;technique    Assistive Device walker, front-wheeled       Lower Body Dressing    Lower Body Dressing Tasks doff;don;socks    Lower Body Dressing [place] close supervision       Timed Up and Go Test    Trial One: Timed Up and Go Test 39    Results, Timed Up and Go Test (Balance) 39       AM-PAC (TM) - ADL (Current Function)    Putting on and taking off regular lower body clothing? 3 - A Little    Bathing? 3 - A  Little    Toileting? 3 - A Little    Putting on/taking off regular upper body clothing? 4 - None    How much help for taking care of personal grooming? 3 - A Little    Eating meals? 4 - None    AM-PAC (TM) ADL Score 20       OT Clinical Impression    Patient's Goals For Discharge take care of myself at home    Plan For Care Reviewed: Occupational Therapy OT plan for care discussed with patient    Impairments Found (OT Eval) aerobic capacity/endurance    Rehab Potential/Prognosis: Occupational Therapy good, to achieve stated therapy goals    OT Frequency of Treatment 3-5 times per week    Anticipated Equipment Needs at Discharge bathing equipment;dressing equipment;front wheeled walker    OT Recommended Discharge Disposition home with caregiver;home with home health    Daily Outcome Statement [facility] 20; Patient is making gains w/ her LE adl's, bed mobility , STS plus RW mobility activities   w/ incr strength /endurance           Pain Assessment/Intervention  Pain Charting Type: Pain Assessment        Education provided this session. See the Patient Education summary report for full details.         OT Care Plan Goals      Most Recent Value   Bed Mobility Goal   Date Goal Established: Bed Mobility  09/23/19   Time to Achieve Goal: Bed Mobility  by discharge   Goal Activity: Bed Mobility  all bed mobility activities   Level of [place] Goal: Bed Mobility  modified independence   Assistive Device: Bed Mobility  leg    Goal Outcome: Bed Mobility  goal ongoing   Lower Body Dressing Goal   Date Goal Established: Lower Body Dressing  09/23/19   Time to Achieve Goal: Lower Body Dressing  by discharge   Level of [place]  modified independence   Adaptive Equipment: Lower Body Dressing  dressing stick, reacher, shoe horn, long handled, sock-aid   Goal Outcome: Lower Body Dressing  goal ongoing   Toilet Transfer Goal   Date Goal Established: Toilet Transfer Training  09/23/19   Time to Achieve Goal: Toilet Transfer  Training  5 days   Level of [place] Goal: Toilet Transfer Training  minimum assist (75% or more patient effort)   Goal Outcome: Toilet Transfer Training  goal ongoing   Toileting Goal   Date Goal Established: Toileting  09/23/19   Time to Achieve Goal: Toileting  5 days   Level of [place] Goal: Toileting  minimum assist (75% patient effort)   Goal Outcome: Toileting  goal ongoing   Transfer Goal   Date Goal Established: Transfer Training  09/23/19   Time to Achieve Goal: Transfer Training  by discharge   Goal Activity: Transfer Training  all transfers   Level of [place] Goal: Transfer Training  modified independence   Assistive Device: Transfer Training  walker, front-wheeled   Goal Outcome: Transfer Training  goal ongoing   Tub-Shower Transfer Goal   Date Goal Established: Tub-Shower Transfer Training  09/23/19   Time to Achieve Goal: Tub-Shower Transfer Training  5 days   Level of [place] Goal: Tub-Shower Transfer Training  minimum assist (75% or more patient effort)   Goal Outcome: Tub-Shower Transfer Training  goal ongoing

## 2019-09-24 NOTE — PROGRESS NOTES
Orthopaedic Surgery Progress Note    [S]  No acute events overnight, resting comfortably. Pain-well controlled.       [O]  Vitals:    09/24/19 0723   BP: (!) 119/58   Pulse: 71   Resp: 17   Temp: 36.8 °C (98.3 °F)   SpO2: 96%       CBC Results       09/24/19 09/03/19                       0300 1450          WBC 13.07 (H) 8.62          RBC 4.15 4.42          HGB 11.6 (L) 12.4          HCT 36.4 37.6          MCV 87.7 85.1          MCH 28.0 28.1          MCHC 31.9 (L) 33.0           241                         BMP Results       09/24/19 09/03/19                       0300 1450           140          K 4.5 3.7          Cl 105 105          CO2 27 29          Glucose 134 (H) 104 (H)          BUN 17 13          Creatinine 0.7 0.7          Calcium 8.4 (L) 8.8 (L)          Anion Gap 6 6          EGFR &gt;60.0 &gt;60.0                           [Physical Exam]  No acute distress    RLE:  Inspection: No gross deformity. Skin intact. Dressing clean, dry, and intact.   Neurovascular: Palpable DP Pulse equal bilaterally. SILT Sural, Saphenous, SPN, DPN, and Tibial Nerve Distributions.  Motor: Active dorsiflexion and plantarflexion of the ankle. Active extension and flexion of the great toe.  Compartments: Soft and compressible.      [A/P:]   74 y.o. female POD 1 s/p R Total Knee Arthroplasty    - PT/OT  - WBAT  - DVT Prophylaxis  - Analgesia  - Monitor neurovascular status    Yordan Bullock MD

## 2019-09-24 NOTE — PLAN OF CARE
Problem: Patient Care Overview  Goal: Plan of Care Review  Outcome: Ongoing (interventions implemented as appropriate)   09/24/19 4972   Coping/Psychosocial   Plan Of Care Reviewed With patient;family   Plan of Care Review   Progress improving   Outcome Summary Pt inititally screened for BMI <18.5. Pt POD #1 R knee arthroplasty, reports good appetite. Pt states UBW is 233# - wt initially recorded in EMR as 112#.  Pt UBW of 233# would indicate BMI 35.4 falls under obesity stage 2 range rather than underweight.  Labs wnl.        Goals:   1.  Pt will continue to meet >/= 75% of nutrition needs.     Recommendations:   1.  Agree with Regular diet as ordered.    2.  Monitor PO intake, wt and labs.

## 2019-09-24 NOTE — NURSING NOTE
Patient had 17 beats of PATs per monitor room.  Patient asymptomatic. Notified Dr. De León about it.

## 2019-09-25 PROCEDURE — 63600000 HC DRUGS/DETAIL CODE: Performed by: NURSE PRACTITIONER

## 2019-09-25 PROCEDURE — 63700000 HC SELF-ADMINISTRABLE DRUG: Performed by: NURSE PRACTITIONER

## 2019-09-25 PROCEDURE — 97116 GAIT TRAINING THERAPY: CPT | Mod: GP

## 2019-09-25 PROCEDURE — 97530 THERAPEUTIC ACTIVITIES: CPT | Mod: GP

## 2019-09-25 PROCEDURE — 12000000 HC ROOM AND CARE MED/SURG

## 2019-09-25 RX ORDER — ADHESIVE BANDAGE
30 BANDAGE TOPICAL DAILY PRN
Status: DISCONTINUED | OUTPATIENT
Start: 2019-09-25 | End: 2019-09-26 | Stop reason: HOSPADM

## 2019-09-25 RX ADMIN — OXYCODONE HYDROCHLORIDE 5 MG: 5 TABLET ORAL at 02:47

## 2019-09-25 RX ADMIN — ASPIRIN 81 MG 81 MG: 81 TABLET ORAL at 20:42

## 2019-09-25 RX ADMIN — OXYCODONE HYDROCHLORIDE 10 MG: 5 TABLET ORAL at 18:10

## 2019-09-25 RX ADMIN — SENNOSIDES AND DOCUSATE SODIUM 1 TABLET: 8.6; 5 TABLET ORAL at 08:33

## 2019-09-25 RX ADMIN — POLYETHYLENE GLYCOL 3350 17 G: 17 POWDER, FOR SOLUTION ORAL at 08:34

## 2019-09-25 RX ADMIN — OXYCODONE HYDROCHLORIDE 10 MG: 5 TABLET ORAL at 12:44

## 2019-09-25 RX ADMIN — OXYCODONE HYDROCHLORIDE 10 MG: 5 TABLET ORAL at 22:44

## 2019-09-25 RX ADMIN — MAGNESIUM HYDROXIDE 30 ML: 400 SUSPENSION ORAL at 13:51

## 2019-09-25 RX ADMIN — LEVOTHYROXINE SODIUM 175 MCG: 175 TABLET ORAL at 06:33

## 2019-09-25 RX ADMIN — SENNOSIDES AND DOCUSATE SODIUM 1 TABLET: 8.6; 5 TABLET ORAL at 20:42

## 2019-09-25 RX ADMIN — ASPIRIN 81 MG 81 MG: 81 TABLET ORAL at 08:34

## 2019-09-25 RX ADMIN — CETIRIZINE HYDROCHLORIDE 5 MG: 5 TABLET ORAL at 22:44

## 2019-09-25 RX ADMIN — DOXYCYCLINE HYCLATE 50 MG: 50 CAPSULE ORAL at 18:10

## 2019-09-25 RX ADMIN — ACETAMINOPHEN 650 MG: 325 TABLET, FILM COATED ORAL at 02:45

## 2019-09-25 RX ADMIN — OXYCODONE HYDROCHLORIDE 5 MG: 5 TABLET ORAL at 08:46

## 2019-09-25 RX ADMIN — KETOROLAC TROMETHAMINE 15 MG: 15 INJECTION, SOLUTION INTRAMUSCULAR; INTRAVENOUS at 02:48

## 2019-09-25 RX ADMIN — ROSUVASTATIN CALCIUM 10 MG: 10 TABLET, FILM COATED ORAL at 22:44

## 2019-09-25 ASSESSMENT — COGNITIVE AND FUNCTIONAL STATUS - GENERAL
WALKING IN HOSPITAL ROOM: 3 - A LITTLE
CLIMB 3 TO 5 STEPS WITH RAILING: 3 - A LITTLE
STANDING UP FROM CHAIR USING ARMS: 4 - NONE
MOVING TO AND FROM BED TO CHAIR: 3 - A LITTLE

## 2019-09-25 NOTE — UM PHYSICIAN REVIEW NOTE
Utilization Secondary Review Note      Patient Name: Milly Shepherd      MRN: [id number]  Insurance: MEDICARE  Admission date: 9/23/2019  Initial order:    Inpatient  Planned admission: Yes  Post Discharge Review: Yes      Inpatient services are appropriate for this 74 y.o.  female admitted for R TKA. Patient requiring IV toradol ATC and Oxycodone PRN for optimization of pain management. PT/OT on board. Patient required > 2 MN of hospital level of care.       Renetta Woodward DO   9/25/2019

## 2019-09-25 NOTE — PROGRESS NOTES
Patient: Milly Shepherd  Location: [facility] 5PAV 5416  MRN: [id number]  Today's date: 9/25/2019    Pt left seated in recliner chair, call bell and personal items w/in reach    Hospital Course  Milly is a 74 y.o. female admitted on 9/23/2019 with Unilateral primary osteoarthritis, right knee [M17.11]  S/P total knee arthroplasty, right [Z96.651]. Principal problem is S/P total knee arthroplasty, right.    Milly has a past medical history of Anemia; Arthritis; Heart palpitations; Hypothyroidism; Lipid disorder; Low back problem; Rosacea; Seasonal allergies; and Sleep apnea.     S/p R TKA           Therapy Pain/Vitals - 09/25/19 0852        Pain/Comfort/Sleep    Pain Charting Type Pain Assessment    Presence of Pain complains of pain/discomfort    Preferred Pain Scale word (verbal rating pain scale)    Pain Body Location - Side Right    Pain Body Location knee    Pain Rating: Rest 2 - mild pain    Pain Rating: Activity 2 - mild pain    Pain Management Interventions position adjusted;prescribed exercises encouraged          Prior Living Environment      Most Recent Value   Lives With  alone   Living Arrangements  house   Living Environment Comment  1SH, 2 KAREN with railing. walk in shower on first floor    Equipment Currently Used at Home  none          Prior Level of Function      Most Recent Value   Ambulation  independent   Transferring  independent   Toileting  independent   Bathing  independent   Dressing  independent   Eating  independent   Communication  understands/communicates without difficulty   Swallowing  swallows foods/liquids without difficulty   Equipment Currently Used at Home  none   Prior Functional Level Comment  PTA pt is independent in ADL/IADL. has SPC does not use. son to place a seat riser on toilet, may have shower chair                 PT Treatment Summary - 09/25/19 3265        Session Details    Document Type daily treatment    Mode of Treatment physical therapy;individual therapy     Patient/Family Observations Pt received in recliner chair in room       Time Calculation    Start Time 0852    Stop Time 0922    Time Calculation (min) 30 min       General Information    Patient Profile Reviewed? yes    Onset of Illness/Injury or Date of Surgery 09/23/19    Referring Physician Vernace    Pertinent History of Current Functional Problem R TKA    Existing Precautions/Restrictions cardiac;fall;weight bearing       Weight Bearing Status    Right LE Weight Bearing Status weight bearing as tolerated       Coping Strategies    Trust Relationship/Rapport care explained       Orientation Log    Comment AAOx3       Cognition/Psychosocial    Safety Awareness intact       Attention    Behavioral Observations WFL       Bed Mobility    Bed Mobility unable to assess    Comment (Bed Mobility) Pt recceived OOB       Transfers    Maintains Weight Bearing Status (Transfers) able to maintain weight bearing status       Sit to Stand Transfer    [place], Sit to Stand Transfer close supervision;verbal cues    Verbal Cues technique    Assistive Device walker, front-wheeled    Comment Very slow, effortful rise. C/o pain and stiffness       Stand to Sit Transfer    [place], Stand to Sit Transfer supervision;verbal cues    Verbal Cues technique    Assistive Device walker, front-wheeled    Comment cues on controlled descent       Gait Analysis/Training    Gait/Stairs Locomotion Gait Training (Group);Stairs Training (Group);Curb Management (Group)       Gait Training    [place], Gait supervision;verbal cues    Assistive Device walker, front-wheeled    Distance in Feet 230 feet    Gait Pattern Utilized step-to    Gait Deviations Identified antalgic;decreased mere;decreased gait speed;decreased heel strike;decreased step length;decreased weight shifting    Maintains Weight Bearing Status able to maintain weight bearing status    Comment Pt ambulates into halls w/RW. EDu on gait mechanics. Pt w/limited ROM, able  to initiate heel strike and toe off intermittently. Continues w/heavy UE reliance on RW. Pt reports feeling much better w/mobility.       Stairs Training    [place], Stairs supervision;verbal cues    Assistive Device railing;cane, straight    Handrail Location right side (ascending)    Number of Stairs 4    Stair Height 6 inches    Ascending Stairs Technique step-to-step    Descending Stairs Technique step-to-step    Comment Completed stairs, mild pain during WB on first step, felt batter after. Good safety awareness       Curb Management    [place] supervision;verbal cues    Assistive Device walker, front-wheeled    Curb Height 6 inches    Comment Good carryover of proper technique, cues on motivation as pt continues w/high anxiety       AM-PAC (TM) - Mobility (Current Function)    Turning from your back to your side while in a flat bed without using bedrails? 4 - None    Moving from lying on your back to sitting on the side of a flat bed without using bedrails? 4 - None    Moving to and from a bed to a chair? 3 - A Little    Standing up from a chair using your arms? 4 - None    To walk in a hospital room? 3 - A Little    Climbing 3-5 steps with a railing? 3 - A Little    AM-PAC (TM) Mobility Score 21       PT Clinical Impression    Plan For Care Reviewed: Physical Therapy PT plan for care discussed with patient    System Pathology/Pathophysiology Noted musculoskeletal    Impairments Found (PT Eval) gait, locomotion, and balance;joint integrity and mobility;muscle performance;ROM (range of motion)    Functional Limitations in Following Categories (PT Eval) self-care;home management    Rehab Potential/Prognosis good, to achieve stated therapy goals    PT Frequency of Treatment 5-7 times per week    Problem List decreased flexibility;decreased ROM;decreased strength;impaired balance;pain    Activity Limitations Related to Problem List functional mobility not performed adequately or safely for household  activity;functional mobility not performed adequately or safely for community activity    Anticipated Equipment Needs at Discharge none    PT Recommended Discharge Disposition home with assist;home with home health    Daily Outcome Statement 9/25: Pt seen for follow-up session. Completed STS, curb and navigation of stairs at Sx1 t/o w/ADs. Continues w/significant anxiety over WB status and potential for pain, despite feeling better w/mobility. Rec DC home w/full-time supervision level assist, pt states she will be DC to SNF.          Pain Assessment/Intervention  Pain Charting Type: Pain Assessment        Equipment Provided: Walker, with Wheels, Adult   Vendor: [employer].    Education provided this session. See the Patient Education summary report for full details.    PT Care Plan Goals      Most Recent Value   Bed Mobility Goal   Date Goal Established: Bed Mobility  09/23/19   Time to Achieve Goal: Bed Mobility  by discharge   Goal Activity: Bed Mobility  all bed mobility activities   Level of [place] Goal: Bed Mobility  modified independence   Assistive Device: Bed Mobility  leg    Goal Outcome: Bed Mobility  goal ongoing   Gait Goal   Date Goal Established: Gait Training  09/23/19   Time to Achieve Goal: Gait Training  by discharge   Level of [place]  modified independence   Assistive Device: Gait Training  walker, front-wheeled   Distance Goal: Gait Training (feet)  100 feet   Transfer Goal   Date Goal Established: Transfer Training  09/23/19   Time to Achieve Goal: Transfer Training  by discharge   Goal Activity: Transfer Training  all transfers   Level of [place] Goal: Transfer Training  modified independence   Assistive Device: Transfer Training  walker, front-wheeled   Goal Outcome: Transfer Training  goal ongoing

## 2019-09-25 NOTE — PROGRESS NOTES
SW met w/ pt and ashley bedside to discuss dispo for tmwr.  SW offered bed at [facility] and bed at [facility].  Pt was alerted that [facility] was on MD's preferred list.  Pt states her preference is for [facility] because family works there.  Bed confirmed with Dada at [facility].  Pt asking for wcv transfer and is agreeable to cost.  SW set up transport with [facility]-w[facility]  for Thursday at 11:00am.  Pt's ashley bedside updated on plan for tmwr.  RN given # for report and d/c instructions can be faxed to (f) [phone]

## 2019-09-25 NOTE — PATIENT CARE CONFERENCE
Care Progression Rounds Note  Date: 9/25/2019  Time: 10:16 AM     Patient Name: Milly Shepherd     Medical Record Number: [id number]   YOB: 1945  Sex: Female      Room/Bed: 5416    Admitting Diagnosis: Unilateral primary osteoarthritis, right knee [M17.11]  S/P total knee arthroplasty, right [Z96.651]   Admit Date/Time: 9/23/2019  7:25 AM    Primary Diagnosis: S/P total knee arthroplasty, right  Principal Problem: S/P total knee arthroplasty, right    GMLOS: pending  Anticipated Discharge Date: 9/26/2019    AM-PAC  Mobility Score: 21    Discharge Planning:  Living Arrangements: house  Concerns To Be Addressed: care coordination/care conferences, discharge planning concerns  Anticipated Discharge Disposition: skilled nursing facility  Type of Skilled Nursing Care Services: PT, OT, nursing    Barriers to Discharge:  Barriers to Discharge: Therapy update pending, Medical issues not resolved    Participants:  advanced practice provider, , nursing, physical therapy, social work/services

## 2019-09-25 NOTE — PLAN OF CARE
Problem: Patient Care Overview  Goal: Plan of Care Review  Outcome: Ongoing (interventions implemented as appropriate)   09/25/19 8902   Coping/Psychosocial   Plan Of Care Reviewed With patient   Plan of Care Review   Progress improving   Outcome Summary Pt OOB to chair with assist x1. Pain controlled with oxycodone. For discharge tomorrow.        Problem: Fall Risk (Adult)  Goal: Absence of Falls  Outcome: Ongoing (interventions implemented as appropriate)      Problem: Knee Arthroplasty (Total, Partial) (Adult)  Goal: Signs and Symptoms of Listed Potential Problems Will be Absent, Minimized or Managed (Knee Arthroplasty)  Outcome: Ongoing (interventions implemented as appropriate)

## 2019-09-25 NOTE — PLAN OF CARE
Problem: Patient Care Overview  Goal: Plan of Care Review  Outcome: Ongoing (interventions implemented as appropriate)   09/25/19 4115   Coping/Psychosocial   Plan Of Care Reviewed With patient   Plan of Care Review   Progress improving   Outcome Summary PT OOB to BR and ambulating in room without issue. Pain managed on current regimen.Cpap compliant overnight. Pt. progressing well        Problem: Fall Risk (Adult)  Goal: Absence of Falls  Outcome: Ongoing (interventions implemented as appropriate)      Problem: Knee Arthroplasty (Total, Partial) (Adult)  Goal: Signs and Symptoms of Listed Potential Problems Will be Absent, Minimized or Managed (Knee Arthroplasty)  Outcome: Ongoing (interventions implemented as appropriate)

## 2019-09-25 NOTE — PROGRESS NOTES
Orthopaedic Surgery Progress Note    [S]  No acute events overnight, resting comfortably. Pain-well controlled.       [O]  Vitals:    09/25/19 0400   BP: 126/62   Pulse: 74   Resp: 16   Temp: 36.4 °C (97.6 °F)   SpO2: 95%       CBC Results       09/24/19 09/03/19                       0300 1450          WBC 13.07 (H) 8.62          RBC 4.15 4.42          HGB 11.6 (L) 12.4          HCT 36.4 37.6          MCV 87.7 85.1          MCH 28.0 28.1          MCHC 31.9 (L) 33.0           241                         BMP Results       09/24/19 09/03/19                       0300 1450           140          K 4.5 3.7          Cl 105 105          CO2 27 29          Glucose 134 (H) 104 (H)          BUN 17 13          Creatinine 0.7 0.7          Calcium 8.4 (L) 8.8 (L)          Anion Gap 6 6          EGFR &gt;60.0 &gt;60.0                           [Physical Exam]  No acute distress    RLE:  Inspection: No gross deformity. Skin intact. Dressing clean, dry, and intact.   Neurovascular: Palpable DP Pulse equal bilaterally. SILT Sural, Saphenous, SPN, DPN, and Tibial Nerve Distributions.  Motor: Active dorsiflexion and plantarflexion of the ankle. Active extension and flexion of the great toe.  Compartments: Soft and compressible.      [A/P:]   74 y.o. female POD 2 s/p R Total Knee Arthroplasty    - PT/OT  - WBAT  - DVT Prophylaxis  - Analgesia  - Monitor neurovascular status    Nacho Pruitt MD

## 2019-09-25 NOTE — PLAN OF CARE
Problem: Patient Care Overview  Goal: Plan of Care Review  Outcome: Ongoing (interventions implemented as appropriate)   09/25/19 1038   Coping/Psychosocial   Plan Of Care Reviewed With patient   Plan of Care Review   Progress progress toward functional goals as expected   Outcome Summary PT session completed

## 2019-09-26 VITALS
BODY MASS INDEX: 35.31 KG/M2 | TEMPERATURE: 97.8 F | SYSTOLIC BLOOD PRESSURE: 132 MMHG | OXYGEN SATURATION: 94 % | RESPIRATION RATE: 16 BRPM | HEIGHT: 68 IN | DIASTOLIC BLOOD PRESSURE: 72 MMHG | HEART RATE: 96 BPM | WEIGHT: 233 LBS

## 2019-09-26 PROCEDURE — 97530 THERAPEUTIC ACTIVITIES: CPT | Mod: GP

## 2019-09-26 PROCEDURE — 63700000 HC SELF-ADMINISTRABLE DRUG: Performed by: NURSE PRACTITIONER

## 2019-09-26 RX ADMIN — OXYCODONE HYDROCHLORIDE 10 MG: 5 TABLET ORAL at 10:26

## 2019-09-26 RX ADMIN — SENNOSIDES AND DOCUSATE SODIUM 1 TABLET: 8.6; 5 TABLET ORAL at 09:39

## 2019-09-26 RX ADMIN — ASPIRIN 81 MG 81 MG: 81 TABLET ORAL at 09:39

## 2019-09-26 RX ADMIN — OXYCODONE HYDROCHLORIDE 10 MG: 5 TABLET ORAL at 05:09

## 2019-09-26 RX ADMIN — LEVOTHYROXINE SODIUM 175 MCG: 175 TABLET ORAL at 06:28

## 2019-09-26 ASSESSMENT — COGNITIVE AND FUNCTIONAL STATUS - GENERAL
MOVING TO AND FROM BED TO CHAIR: 3 - A LITTLE
STANDING UP FROM CHAIR USING ARMS: 4 - NONE
CLIMB 3 TO 5 STEPS WITH RAILING: 3 - A LITTLE
WALKING IN HOSPITAL ROOM: 3 - A LITTLE

## 2019-09-26 NOTE — PATIENT CARE CONFERENCE
Care Progression Rounds Note  Date: 9/26/2019  Time: 10:02 AM     Patient Name: Milly Shepherd     Medical Record Number: [id number]   YOB: 1945  Sex: Female      Room/Bed: 5416    Admitting Diagnosis: Unilateral primary osteoarthritis, right knee [M17.11]  S/P total knee arthroplasty, right [Z96.651]   Admit Date/Time: 9/23/2019  7:25 AM    Primary Diagnosis: S/P total knee arthroplasty, right  Principal Problem: S/P total knee arthroplasty, right    GMLOS: 2.2  Anticipated Discharge Date: 9/26/2019    AM-PAC  Mobility Score: 21    Discharge Planning:  Living Arrangements: house  Concerns To Be Addressed: care coordination/care conferences, discharge planning concerns  Anticipated Discharge Disposition: skilled nursing facility  Type of Skilled Nursing Care Services: PT, OT, nursing    Barriers to Discharge:  Barriers to Discharge: None    Participants:  advanced practice provider, , physical therapy, social work/services

## 2019-09-26 NOTE — DISCHARGE INSTRUCTIONS
DVT Prophylaxis:   -Continue with Aspirin 81 mg by mouth twice daily X 6 weeks for blood clot prevention.  -Please resume your home dose Aspirin once the above regimen is complete      Incision Care:  -Please remove your surgical dressing on 9/28/2019. At that time if the wound is dry and not draining, you can leave it uncovered, open to air. If there is drainage, please place 4x4s covered by paper tape over your incision.  -Please do not submerge incision in water, no baths, no swimming, no pools, no hot tubs, etc.   -Please keep incision clean and dry. Once your dressing has been removed, do not scrub the incision in the shower and pat dry with a clean towel not used to dry your body.   -Please make sure your incision(s) are checked for signs and symptoms of infection: If any of the below should occur, please call the office:   -drainage from incision site, opening of incision, increased redness and/or tenderness, fevers greater than 101, flu-like symptoms.   -Please call office or go to ED with any thigh/calf pain or swelling in legs, chest pain, chest congestion, problems with breathing.     Constipation/Pain Medication:   -Take your pain medication with food. Do not drive while taking pain medication.   -Pain medications may cause constipation.   -If you have not had a bowel movement in 3 days please call the office   - Please take Senna-Colace as prescribed. Hold for loose stool. If you are having difficulties having a bowel   movement or haven't had bowel movement in 2 days, please add over the counter miralax and take as directed on package.   -Drink plenty of fluids and eat fresh fruits and vegetables.   -DO NOT SMOKE! Smoking is not healthy for your healing process.

## 2019-09-26 NOTE — PLAN OF CARE
Problem: Patient Care Overview  Goal: Plan of Care Review  Outcome: Ongoing (interventions implemented as appropriate)   09/26/19 0359   Coping/Psychosocial   Plan Of Care Reviewed With patient   Plan of Care Review   Progress improving   Outcome Summary PT OOB ambulating to the chair and BR. Voiding without issue. Tolerating meals. Pain well managed with current regimen. Cpap compliant.       Problem: Fall Risk (Adult)  Goal: Absence of Falls  Outcome: Ongoing (interventions implemented as appropriate)      Problem: Knee Arthroplasty (Total, Partial) (Adult)  Goal: Signs and Symptoms of Listed Potential Problems Will be Absent, Minimized or Managed (Knee Arthroplasty)  Outcome: Ongoing (interventions implemented as appropriate)

## 2019-09-26 NOTE — PLAN OF CARE
Problem: Patient Care Overview  Goal: Plan of Care Review  Outcome: Ongoing (interventions implemented as appropriate)   09/26/19 1209   Coping/Psychosocial   Plan Of Care Reviewed With patient;daughter   Plan of Care Review   Progress progress toward functional goals as expected   Outcome Summary PT session completed

## 2019-09-26 NOTE — PLAN OF CARE
Problem: Patient Care Overview  Goal: Plan of Care Review  Outcome: Ongoing (interventions implemented as appropriate)   09/26/19 1013   Coping/Psychosocial   Plan Of Care Reviewed With patient   Plan of Care Review   Progress improving   Outcome Summary Pt ax1 oob with walker. Had BM today. For d/c this morning to [facility]        Problem: Fall Risk (Adult)  Goal: Absence of Falls  Outcome: Ongoing (interventions implemented as appropriate)      Problem: Knee Arthroplasty (Total, Partial) (Adult)  Goal: Signs and Symptoms of Listed Potential Problems Will be Absent, Minimized or Managed (Knee Arthroplasty)  Outcome: Ongoing (interventions implemented as appropriate)

## 2019-09-26 NOTE — PLAN OF CARE
Problem: Patient Care Overview  Goal: Discharge Needs Assessment  Outcome: Adequate for Discharge   09/24/19 1253 09/26/19 1032   DC Needs Assessment   Concerns To Be Addressed care coordination/care conferences;discharge planning concerns --    Concerns Comments --  s/p right knee replacemenbt   Anticipated Discharge Disposition skilled nursing facility --    Equipment Needed After Discharge walker, rolling --    Current Discharge Risk dependent with mobility/activities of daily living;lives alone --    Current Health   Anticipated Changes Related to Illness inability to care for self --    Discharge Planning   Patient/Family Concerns Related to Expected Discharge Disposition --  wcv transfer     Pt for d/c today to [facility], bed confirmed with Dada.  Transport with [facility]cv  at 11, pt agreeable to cost.  Medicare letter signed, ashley bedside when SW spoke w/ pt this morning about d/c plan and both in agreement.  [facility] faxed d/c instructions to [phone] and RN given # for report.

## 2019-09-26 NOTE — PROGRESS NOTES
Patient: Milly Shepherd  Location: [facility] 5PAV [id number]  MRN: [id number]  Today's date: 9/26/2019    Pt left seated EOB in room, daughter present, nsg notified    Hospital Course  Milly is a 74 y.o. female admitted on 9/23/2019 with Unilateral primary osteoarthritis, right knee [M17.11]  S/P total knee arthroplasty, right [Z96.651]. Principal problem is S/P total knee arthroplasty, right.    Milly has a past medical history of Anemia; Arthritis; Heart palpitations; Hypothyroidism; Lipid disorder; Low back problem; Rosacea; Seasonal allergies; and Sleep apnea.     S/p R TKA           Therapy Pain/Vitals - 09/26/19 0805        Pain/Comfort/Sleep    Pain Charting Type Pain Assessment    Presence of Pain complains of pain/discomfort    Preferred Pain Scale word (verbal rating pain scale)    Pain Body Location - Side Right    Pain Body Location knee    Pain Rating: Rest 4 - moderate pain    Pain Rating: Activity 4 - moderate pain    Pain Management Interventions position adjusted;prescribed exercises encouraged          Prior Living Environment      Most Recent Value   Lives With  alone   Living Arrangements  house   Living Environment Comment  1SH, 2 KAREN with railing. walk in shower on first floor    Equipment Currently Used at Home  none          Prior Level of Function      Most Recent Value   Ambulation  independent   Transferring  independent   Toileting  independent   Bathing  independent   Dressing  independent   Eating  independent   Communication  understands/communicates without difficulty   Swallowing  swallows foods/liquids without difficulty   Equipment Currently Used at Home  none   Prior Functional Level Comment  PTA pt is independent in ADL/IADL. has SPC does not use. son to place a seat riser on toilet, may have shower chair                 PT Treatment Summary - 09/26/19 0805        Session Details    Document Type daily treatment    Mode of Treatment physical therapy;individual therapy     Patient/Family Observations Pt received in recliner chair, daughter in room       Time Calculation    Start Time 0805    Stop Time 0820    Time Calculation (min) 15 min       General Information    Patient Profile Reviewed? yes    Onset of Illness/Injury or Date of Surgery 09/23/19    Referring Physician Vernace    Pertinent History of Current Functional Problem R TKA    Existing Precautions/Restrictions cardiac;fall;weight bearing       Weight Bearing Status    Right LE Weight Bearing Status weight bearing as tolerated       Coping Strategies    Trust Relationship/Rapport care explained       Orientation Log    Comment AAOx3       Cognition/Psychosocial    Safety Awareness intact       Attention    Behavioral Observations WFL       Bed Mobility    Bed Mobility unable to assess    Comment (Bed Mobility) Pt received OOB       Transfers    Maintains Weight Bearing Status (Transfers) able to maintain weight bearing status       Sit to Stand Transfer    Bell, Sit to Stand Transfer supervision    Assistive Device walker, front-wheeled    Comment Pain during exertion       Stand to Sit Transfer    Bell, Stand to Sit Transfer supervision    Assistive Device walker, front-wheeled       Gait Analysis/Training    Gait/Stairs Locomotion Gait Training (Group)       Gait Training    Bell, Gait supervision;verbal cues    Assistive Device walker, front-wheeled    Distance in Feet 110 feet    Gait Pattern Utilized step-through    Gait Deviations Identified antalgic;decreased mere;decreased gait speed;decreased step length    Maintains Weight Bearing Status able to maintain weight bearing status    Comment Pt able to ambulate into boone w/RW. C/o significant stiffness t/o. Cues on step length to promote stretch and improved gait mechanics. Continued edu on light mobility at least 1x per hour via ambulation or chair exercises       AM-PAC (TM) - Mobility (Current Function)    Turning from your back to your  side while in a flat bed without using bedrails? 4 - None    Moving from lying on your back to sitting on the side of a flat bed without using bedrails? 4 - None    Moving to and from a bed to a chair? 3 - A Little    Standing up from a chair using your arms? 4 - None    To walk in a hospital room? 3 - A Little    Climbing 3-5 steps with a railing? 3 - A Little    AM-PAC (TM) Mobility Score 21       PT Clinical Impression    Plan For Care Reviewed: Physical Therapy PT plan for care discussed with patient    System Pathology/Pathophysiology Noted musculoskeletal    Impairments Found (PT Eval) aerobic capacity/endurance;gait, locomotion, and balance;joint integrity and mobility;muscle performance;ROM (range of motion)    Functional Limitations in Following Categories (PT Eval) self-care;home management    Rehab Potential/Prognosis good, to achieve stated therapy goals    PT Frequency of Treatment 5-7 times per week    Problem List decreased flexibility;decreased ROM;decreased strength;impaired balance;pain    Activity Limitations Related to Problem List functional mobility not performed adequately or safely for household activity;functional mobility not performed adequately or safely for community activity    Anticipated Equipment Needs at Discharge none    PT Recommended Discharge Disposition home with assist;home with home health    Daily Outcome Statement 9/26: Pt seen for follow-up assessment. C/o significant stiffness t/o session. Continued edu on light mobility at least 1x per hour. Pt voiced understanding and sated she felt better w/ambulation. Able to walk into halls w/RW, mild improvements w/stride length w/cueing. Anticipate DC today to  [facility] when medically cleared          Pain Assessment/Intervention  Pain Charting Type: Pain Assessment        Equipment Provided: Walker, with Wheels, Adult   Vendor: [employer].    Education provided this session. See the Patient Education summary  report for full details.    PT Care Plan Goals      Most Recent Value   Bed Mobility Goal   Date Goal Established: Bed Mobility  09/23/19   Time to Achieve Goal: Bed Mobility  by discharge   Goal Activity: Bed Mobility  all bed mobility activities   Level of [place] Goal: Bed Mobility  modified independence   Assistive Device: Bed Mobility  leg    Goal Outcome: Bed Mobility  goal ongoing   Gait Goal   Date Goal Established: Gait Training  09/23/19   Time to Achieve Goal: Gait Training  by discharge   Level of [place]  modified independence   Assistive Device: Gait Training  walker, front-wheeled   Distance Goal: Gait Training (feet)  100 feet   Transfer Goal   Date Goal Established: Transfer Training  09/23/19   Time to Achieve Goal: Transfer Training  by discharge   Goal Activity: Transfer Training  all transfers   Level of [place] Goal: Transfer Training  modified independence   Assistive Device: Transfer Training  walker, front-wheeled   Goal Outcome: Transfer Training  goal ongoing

## 2019-09-27 NOTE — DISCHARGE SUMMARY
Pt is a 74 y.o. female with a history of SVT, DIONY, hypoT, HLD, DIONY who presented with severe osteoarthritis. Dr. Fernandez recommended R TKA. The patient underwent the procedure on 9/23/2019. Patient tolerated the procedure well. The patient's post-operative course was uncomplicated. The patient was seen by the orthopaedic and hospital medicine service and progressed to the point that on the day of discharge they were voiding without difficulty, tolerating a house diet, and was comfortable with all post-operative activity restrictions. At discharge the patient's vital signs were stable and the surgical incision site was clean, dry and intact. The patient was discharged on 9/26/2019 and told to take ASA 81 BID x6w for DVT prophylaxis, continue pain medications and bowel regimen as needed, resume home medications except for those marked as held on discharge instructions, and to follow up with their surgeon. The patient was advised to call the office with any problems, including drainage from the incision, redness around the incision, worsening pain and fever greater than 101 degrees F.

## 2021-01-31 DIAGNOSIS — Z23 ENCOUNTER FOR IMMUNIZATION: ICD-10-CM

## 2025-08-04 ENCOUNTER — APPOINTMENT (OUTPATIENT)
Dept: RADIOLOGY | Facility: CLINIC | Age: 80
End: 2025-08-04
Attending: NURSE PRACTITIONER
Payer: MEDICARE

## 2025-08-04 DIAGNOSIS — R05.8 OTHER SPECIFIED COUGH: ICD-10-CM

## 2025-08-04 PROCEDURE — 71046 X-RAY EXAM CHEST 2 VIEWS: CPT

## (undated) DEVICE — GLOVE SZ 8 LINER PROTEXIS PI BL

## (undated) DEVICE — SUTURE VICRYL PLUS 2-0 VCP869H

## (undated) DEVICE — APPLICATOR CHLORAPREP 26ML ORANGE TINT

## (undated) DEVICE — SOLN IV 0.9% NSS 1000ML

## (undated) DEVICE — NEEDLE DISP HYPO 21G X1 1/2 IN

## (undated) DEVICE — DRAPE TOWEL 17X23 NON-STERILE

## (undated) DEVICE — BANDAGE ESMARK 6X12 LATEX FREE

## (undated) DEVICE — MANIFOLD FOUR PORT NEPTUNE

## (undated) DEVICE — BLANKET UPPER BODY BAIR HUGGER

## (undated) DEVICE — SET HANDPIECE INTERPULSE

## (undated) DEVICE — SUTURE STRATAFIX PDO 1 CTX TAPER 36CM

## (undated) DEVICE — Device

## (undated) DEVICE — COVER LIGHTHANDLE

## (undated) DEVICE — ADHESIVE SKIN DERMABOND ADVANCED 0.7ML

## (undated) DEVICE — SYRINGE DISP LUER-LOK 30 CC

## (undated) DEVICE — ***USE 57698*** SLEEVE FLOWTRON DVT CALF SINGLE USE

## (undated) DEVICE — BLADE SCALPEL #10

## (undated) DEVICE — SOLN IRRIG STER WATER 1000ML

## (undated) DEVICE — BLADE SAGITTAL #127 STABLECUT

## (undated) DEVICE — VEST STERILE

## (undated) DEVICE — STOCKINETTE 8IN STERILE MEDC

## (undated) DEVICE — CUFF TOURNIQUET DISP 34 X 4

## (undated) DEVICE — PAD GROUND ELECTROSURGICAL W/CORD

## (undated) DEVICE — DRESSING MEPILEX 4X10 BORDER

## (undated) DEVICE — HOOD FLYTE SURGICOOL

## (undated) DEVICE — DRAPE-U NON-STERILE

## (undated) DEVICE — GLOVE SURG PROTEXIS PF 7.5

## (undated) DEVICE — GLOVE SURG PROTEXIS PF 8

## (undated) DEVICE — SUTURE STRATAFIX PGA 3-0 FS-1 CUTTING 30CM